# Patient Record
Sex: MALE | Race: WHITE | NOT HISPANIC OR LATINO | Employment: FULL TIME | URBAN - METROPOLITAN AREA
[De-identification: names, ages, dates, MRNs, and addresses within clinical notes are randomized per-mention and may not be internally consistent; named-entity substitution may affect disease eponyms.]

---

## 2017-03-11 ENCOUNTER — APPOINTMENT (OUTPATIENT)
Dept: CARDIOLOGY | Facility: HOSPITAL | Age: 52
End: 2017-03-11

## 2017-03-11 ENCOUNTER — HOSPITAL ENCOUNTER (INPATIENT)
Facility: HOSPITAL | Age: 52
LOS: 3 days | Discharge: HOME | End: 2017-03-14
Attending: HOSPITALIST | Admitting: INTERNAL MEDICINE

## 2017-03-11 DIAGNOSIS — I46.9 ASYSTOLE BY ELECTROCARDIOGRAM (HCC): Primary | ICD-10-CM

## 2017-03-11 PROBLEM — R00.1 BRADYCARDIA BY ELECTROCARDIOGRAM: Status: ACTIVE | Noted: 2017-03-11

## 2017-03-11 PROBLEM — R55 SYNCOPE AND COLLAPSE: Status: ACTIVE | Noted: 2017-03-11

## 2017-03-11 LAB
ALBUMIN SERPL-MCNC: 4.2 G/DL (ref 3.2–4.8)
ALBUMIN/GLOB SERPL: 1.5 G/DL (ref 1.5–2.5)
ALP SERPL-CCNC: 81 U/L (ref 25–100)
ALT SERPL W P-5'-P-CCNC: 23 U/L (ref 7–40)
ANION GAP SERPL CALCULATED.3IONS-SCNC: 7 MMOL/L (ref 3–11)
APTT PPP: 25 SECONDS (ref 24–31)
AST SERPL-CCNC: 24 U/L (ref 0–33)
BASOPHILS # BLD AUTO: 0.01 10*3/MM3 (ref 0–0.2)
BASOPHILS NFR BLD AUTO: 0.1 % (ref 0–1)
BH CV ECHO MEAS - AO MAX PG (FULL): 2 MMHG
BH CV ECHO MEAS - AO MAX PG: 9.7 MMHG
BH CV ECHO MEAS - AO ROOT AREA (BSA CORRECTED): 1.3
BH CV ECHO MEAS - AO ROOT AREA: 5.3 CM^2
BH CV ECHO MEAS - AO ROOT DIAM: 2.6 CM
BH CV ECHO MEAS - AO V2 MAX: 156 CM/SEC
BH CV ECHO MEAS - AVA(V,A): 2.8 CM^2
BH CV ECHO MEAS - AVA(V,D): 2.8 CM^2
BH CV ECHO MEAS - BSA(HAYCOCK): 2.1 M^2
BH CV ECHO MEAS - BSA: 2.1 M^2
BH CV ECHO MEAS - BZI_BMI: 29.5 KILOGRAMS/M^2
BH CV ECHO MEAS - BZI_METRIC_HEIGHT: 175.3 CM
BH CV ECHO MEAS - BZI_METRIC_WEIGHT: 90.7 KG
BH CV ECHO MEAS - CONTRAST EF (2CH): 55.7 ML/M^2
BH CV ECHO MEAS - CONTRAST EF 4CH: 71.7 ML/M^2
BH CV ECHO MEAS - EDV(CUBED): 107.2 ML
BH CV ECHO MEAS - EDV(MOD-SP2): 70 ML
BH CV ECHO MEAS - EDV(MOD-SP4): 106 ML
BH CV ECHO MEAS - EDV(TEICH): 104.9 ML
BH CV ECHO MEAS - EF(CUBED): 81.2 %
BH CV ECHO MEAS - EF(MOD-SP2): 55.7 %
BH CV ECHO MEAS - EF(MOD-SP4): 71.7 %
BH CV ECHO MEAS - EF(TEICH): 73.8 %
BH CV ECHO MEAS - ESV(CUBED): 20.1 ML
BH CV ECHO MEAS - ESV(MOD-SP2): 31 ML
BH CV ECHO MEAS - ESV(MOD-SP4): 30 ML
BH CV ECHO MEAS - ESV(TEICH): 27.5 ML
BH CV ECHO MEAS - FS: 42.7 %
BH CV ECHO MEAS - IVS/LVPW: 0.95
BH CV ECHO MEAS - IVSD: 1.2 CM
BH CV ECHO MEAS - LA DIMENSION: 3.4 CM
BH CV ECHO MEAS - LA/AO: 1.3
BH CV ECHO MEAS - LAT PEAK E' VEL: 7.1 CM/SEC
BH CV ECHO MEAS - LV DIASTOLIC VOL/BSA (35-75): 51.3 ML/M^2
BH CV ECHO MEAS - LV MASS(C)D: 223.3 GRAMS
BH CV ECHO MEAS - LV MASS(C)DI: 108.1 GRAMS/M^2
BH CV ECHO MEAS - LV MAX PG: 7.7 MMHG
BH CV ECHO MEAS - LV MEAN PG: 4 MMHG
BH CV ECHO MEAS - LV SYSTOLIC VOL/BSA (12-30): 14.5 ML/M^2
BH CV ECHO MEAS - LV V1 MAX: 139 CM/SEC
BH CV ECHO MEAS - LV V1 MEAN: 87.7 CM/SEC
BH CV ECHO MEAS - LV V1 VTI: 24 CM
BH CV ECHO MEAS - LVIDD: 4.8 CM
BH CV ECHO MEAS - LVIDS: 2.7 CM
BH CV ECHO MEAS - LVLD AP2: 7.3 CM
BH CV ECHO MEAS - LVLD AP4: 8.1 CM
BH CV ECHO MEAS - LVLS AP2: 6.5 CM
BH CV ECHO MEAS - LVLS AP4: 6.8 CM
BH CV ECHO MEAS - LVOT AREA (M): 3.1 CM^2
BH CV ECHO MEAS - LVOT AREA: 3.1 CM^2
BH CV ECHO MEAS - LVOT DIAM: 2 CM
BH CV ECHO MEAS - LVPWD: 1.3 CM
BH CV ECHO MEAS - MED PEAK E' VEL: 7.6 CM/SEC
BH CV ECHO MEAS - MV A MAX VEL: 79 CM/SEC
BH CV ECHO MEAS - MV E MAX VEL: 94.8 CM/SEC
BH CV ECHO MEAS - MV E/A: 1.2
BH CV ECHO MEAS - PA ACC SLOPE: 710 CM/SEC^2
BH CV ECHO MEAS - PA ACC TIME: 0.13 SEC
BH CV ECHO MEAS - PA MAX PG: 7.3 MMHG
BH CV ECHO MEAS - PA PR(ACCEL): 22.1 MMHG
BH CV ECHO MEAS - PA V2 MAX: 135.5 CM/SEC
BH CV ECHO MEAS - PI END-D VEL: 92.9 CM/SEC
BH CV ECHO MEAS - RVDD: 2.5 CM
BH CV ECHO MEAS - SI(CUBED): 42.1 ML/M^2
BH CV ECHO MEAS - SI(LVOT): 36.5 ML/M^2
BH CV ECHO MEAS - SI(MOD-SP2): 18.9 ML/M^2
BH CV ECHO MEAS - SI(MOD-SP4): 36.8 ML/M^2
BH CV ECHO MEAS - SI(TEICH): 37.5 ML/M^2
BH CV ECHO MEAS - SV(CUBED): 87 ML
BH CV ECHO MEAS - SV(LVOT): 75.4 ML
BH CV ECHO MEAS - SV(MOD-SP2): 39 ML
BH CV ECHO MEAS - SV(MOD-SP4): 76 ML
BH CV ECHO MEAS - SV(TEICH): 77.4 ML
BH CV ECHO MEAS - TAPSE (>1.6): 2.5 CM2
BH CV XLRA - RV BASE: 3.6 CM
BH CV XLRA - RV LENGTH: 7.9 CM
BH CV XLRA - RV MID: 3 CM
BH CV XLRA - TDI S': 18.6 CM/SEC
BILIRUB SERPL-MCNC: 0.5 MG/DL (ref 0.3–1.2)
BUN BLD-MCNC: 17 MG/DL (ref 9–23)
BUN/CREAT SERPL: 18.9 (ref 7–25)
CALCIUM SPEC-SCNC: 9.6 MG/DL (ref 8.7–10.4)
CHLORIDE SERPL-SCNC: 107 MMOL/L (ref 99–109)
CK SERPL-CCNC: 244 U/L (ref 26–174)
CO2 SERPL-SCNC: 26 MMOL/L (ref 20–31)
CREAT BLD-MCNC: 0.9 MG/DL (ref 0.6–1.3)
DEPRECATED RDW RBC AUTO: 41.8 FL (ref 37–54)
E/E' RATIO: 12.9
EOSINOPHIL # BLD AUTO: 0.06 10*3/MM3 (ref 0.1–0.3)
EOSINOPHIL NFR BLD AUTO: 0.5 % (ref 0–3)
ERYTHROCYTE [DISTWIDTH] IN BLOOD BY AUTOMATED COUNT: 12.7 % (ref 11.3–14.5)
GFR SERPL CREATININE-BSD FRML MDRD: 89 ML/MIN/1.73
GLOBULIN UR ELPH-MCNC: 2.8 GM/DL
GLUCOSE BLD-MCNC: 108 MG/DL (ref 70–100)
GLUCOSE BLDC GLUCOMTR-MCNC: 107 MG/DL (ref 70–130)
HCT VFR BLD AUTO: 48.4 % (ref 38.9–50.9)
HGB BLD-MCNC: 16.6 G/DL (ref 13.1–17.5)
IMM GRANULOCYTES # BLD: 0.03 10*3/MM3 (ref 0–0.03)
IMM GRANULOCYTES NFR BLD: 0.3 % (ref 0–0.6)
INR PPP: 0.98
LV EF 2D ECHO EST: 75 %
LYMPHOCYTES # BLD AUTO: 1.63 10*3/MM3 (ref 0.6–4.8)
LYMPHOCYTES NFR BLD AUTO: 14.3 % (ref 24–44)
MAGNESIUM SERPL-MCNC: 2.2 MG/DL (ref 1.3–2.7)
MCH RBC QN AUTO: 31 PG (ref 27–31)
MCHC RBC AUTO-ENTMCNC: 34.3 G/DL (ref 32–36)
MCV RBC AUTO: 90.5 FL (ref 80–99)
MONOCYTES # BLD AUTO: 0.67 10*3/MM3 (ref 0–1)
MONOCYTES NFR BLD AUTO: 5.9 % (ref 0–12)
NEUTROPHILS # BLD AUTO: 9 10*3/MM3 (ref 1.5–8.3)
NEUTROPHILS NFR BLD AUTO: 78.9 % (ref 41–71)
PHOSPHATE SERPL-MCNC: 3 MG/DL (ref 2.4–5.1)
PLATELET # BLD AUTO: 197 10*3/MM3 (ref 150–450)
PMV BLD AUTO: 10.5 FL (ref 6–12)
POTASSIUM BLD-SCNC: 3.8 MMOL/L (ref 3.5–5.5)
PROT SERPL-MCNC: 7 G/DL (ref 5.7–8.2)
PROTHROMBIN TIME: 10.7 SECONDS (ref 9.6–11.5)
RBC # BLD AUTO: 5.35 10*6/MM3 (ref 4.2–5.76)
SODIUM BLD-SCNC: 140 MMOL/L (ref 132–146)
TROPONIN I SERPL-MCNC: <0.006 NG/ML
TSH SERPL DL<=0.05 MIU/L-ACNC: 0.99 MIU/ML (ref 0.35–5.35)
WBC NRBC COR # BLD: 11.4 10*3/MM3 (ref 3.5–10.8)

## 2017-03-11 PROCEDURE — 85610 PROTHROMBIN TIME: CPT | Performed by: NURSE PRACTITIONER

## 2017-03-11 PROCEDURE — 85025 COMPLETE CBC W/AUTO DIFF WBC: CPT | Performed by: NURSE PRACTITIONER

## 2017-03-11 PROCEDURE — 93306 TTE W/DOPPLER COMPLETE: CPT | Performed by: INTERNAL MEDICINE

## 2017-03-11 PROCEDURE — 93010 ELECTROCARDIOGRAM REPORT: CPT | Performed by: INTERNAL MEDICINE

## 2017-03-11 PROCEDURE — 99223 1ST HOSP IP/OBS HIGH 75: CPT | Performed by: INTERNAL MEDICINE

## 2017-03-11 PROCEDURE — 93306 TTE W/DOPPLER COMPLETE: CPT

## 2017-03-11 PROCEDURE — 84100 ASSAY OF PHOSPHORUS: CPT | Performed by: NURSE PRACTITIONER

## 2017-03-11 PROCEDURE — 99254 IP/OBS CNSLTJ NEW/EST MOD 60: CPT | Performed by: INTERNAL MEDICINE

## 2017-03-11 PROCEDURE — 83735 ASSAY OF MAGNESIUM: CPT | Performed by: NURSE PRACTITIONER

## 2017-03-11 PROCEDURE — 82962 GLUCOSE BLOOD TEST: CPT

## 2017-03-11 PROCEDURE — 84443 ASSAY THYROID STIM HORMONE: CPT | Performed by: NURSE PRACTITIONER

## 2017-03-11 PROCEDURE — 82550 ASSAY OF CK (CPK): CPT | Performed by: NURSE PRACTITIONER

## 2017-03-11 PROCEDURE — 85730 THROMBOPLASTIN TIME PARTIAL: CPT | Performed by: NURSE PRACTITIONER

## 2017-03-11 PROCEDURE — 80053 COMPREHEN METABOLIC PANEL: CPT | Performed by: NURSE PRACTITIONER

## 2017-03-11 PROCEDURE — 84484 ASSAY OF TROPONIN QUANT: CPT | Performed by: NURSE PRACTITIONER

## 2017-03-11 PROCEDURE — 93005 ELECTROCARDIOGRAM TRACING: CPT | Performed by: HOSPITALIST

## 2017-03-11 PROCEDURE — 25010000002 ENOXAPARIN PER 10 MG: Performed by: NURSE PRACTITIONER

## 2017-03-11 RX ORDER — FAMOTIDINE 20 MG/1
20 TABLET, FILM COATED ORAL 2 TIMES DAILY PRN
Status: DISCONTINUED | OUTPATIENT
Start: 2017-03-11 | End: 2017-03-14 | Stop reason: HOSPADM

## 2017-03-11 RX ORDER — ACETAMINOPHEN 325 MG/1
650 TABLET ORAL EVERY 4 HOURS PRN
Status: DISCONTINUED | OUTPATIENT
Start: 2017-03-11 | End: 2017-03-14 | Stop reason: HOSPADM

## 2017-03-11 RX ORDER — CALCIUM CARBONATE 200(500)MG
2 TABLET,CHEWABLE ORAL 2 TIMES DAILY PRN
Status: DISCONTINUED | OUTPATIENT
Start: 2017-03-11 | End: 2017-03-14 | Stop reason: HOSPADM

## 2017-03-11 RX ORDER — ONDANSETRON 4 MG/1
4 TABLET, FILM COATED ORAL EVERY 6 HOURS PRN
Status: DISCONTINUED | OUTPATIENT
Start: 2017-03-11 | End: 2017-03-14 | Stop reason: HOSPADM

## 2017-03-11 RX ORDER — SODIUM CHLORIDE 0.9 % (FLUSH) 0.9 %
1-10 SYRINGE (ML) INJECTION AS NEEDED
Status: DISCONTINUED | OUTPATIENT
Start: 2017-03-11 | End: 2017-03-14 | Stop reason: HOSPADM

## 2017-03-11 RX ORDER — ONDANSETRON 2 MG/ML
4 INJECTION INTRAMUSCULAR; INTRAVENOUS EVERY 6 HOURS PRN
Status: DISCONTINUED | OUTPATIENT
Start: 2017-03-11 | End: 2017-03-14 | Stop reason: HOSPADM

## 2017-03-11 RX ADMIN — ENOXAPARIN SODIUM 40 MG: 40 INJECTION SUBCUTANEOUS at 10:26

## 2017-03-11 RX ADMIN — ACETAMINOPHEN 650 MG: 325 TABLET, FILM COATED ORAL at 09:05

## 2017-03-11 NOTE — CONSULTS
[facility] at [facility]   Consult Note    Referring Provider: Dr. Mauricio    Reason for Consultation: Syncope and reported pause    Patient Care Team:  Maribel Lyon MD as PCP - General (Family Medicine)     Problem List:  1. Syncope  1. Previous history of similar episode 15 years ago.  2. Documented significant positive at [facility].  2. Surgeries:  1. Bilateral knee surgery        Allergies   Allergen Reactions   • Levaquin [Levofloxacin] Hives           Current Facility-Administered Medications:   •  acetaminophen (TYLENOL) tablet 650 mg, 650 mg, Oral, Q4H PRN, Jeni Renwick V, APRN, 650 mg at 03/11/17 0905  •  calcium carbonate (TUMS) chewable tablet 500 mg (200 mg elemental), 2 tablet, Oral, BID PRN, Jeni Julián V, APRN  •  enoxaparin (LOVENOX) syringe 40 mg, 40 mg, Subcutaneous, Daily, Jeni Julián V, APRN  •  famotidine (PEPCID) tablet 20 mg, 20 mg, Oral, BID PRN, Jeni Julián V, APRN  •  ondansetron (ZOFRAN) tablet 4 mg, 4 mg, Oral, Q6H PRN **OR** ondansetron (ZOFRAN) injection 4 mg, 4 mg, Intravenous, Q6H PRN, Jeni Julián V, APRN  •  sodium chloride 0.9 % flush 1-10 mL, 1-10 mL, Intravenous, PRN, Jeni Renwick V, APRN         Prescriptions Prior to Admission   Medication Sig Dispense Refill Last Dose   • famciclovir (FAMVIR) 500 MG tablet Take 1 tablet by mouth 3 (three) times a day. 21 tablet 0          Subjective .   History of present illness:    Patient is a 52-year-old  male who were asked to see secondary to syncope as well as documented significant polyp.  He has no previous history of any coronary disease.  He underwent cardiac evaluation with Dr. reid this past summer.  At that time he had a routine stress test which was negative for ischemia.  He also had an echocardiogram which was overall unremarkable.  Patient notes he was in his usual state of health up until about 2 months ago whenever he has noticed some recurrent dizziness.  This has no specific  "triggering factors noted.  Then yesterday he notes that he was sitting on the couch with his wife looking at a computer.  He then had some sharp discomfort in his upper abdomen with subsequent syncope.  Patient's wife notes that he was out for at least a couple of minutes.  EMS was contacted and he was taken to [facility] in [place].  There patient again had another syncopal episode with documented 16 seconds positive.  He was transferred to [facility] for higher level of care and further evaluation.  The patient notes that he has had a similar episode to this roughly 15 years ago.  At that time.  Discussed permanent pacemaker implant.  However, he also coinciding Kun had an infection.  They have wished to have this infection cleared up prior to pacemaker implant the patient notes that he was lost to follow-up.    Second episode of syncope, this associated with 16 seconds of asystole.  Of note the patient has episodes of presyncope that occurs 3-4 times weekly, for which he \"walks around and his heart rate picks up\" with his recurrent recent syncope, he did not get up and time, and asked he had true loss of consciousness.  Social History     Social History   • Marital status:      Spouse name: N/A   • Number of children: N/A   • Years of education: N/A     Occupational History   • Not on file.     Social History Main Topics   • Smoking status: Never Smoker   • Smokeless tobacco: Not on file   • Alcohol use No   • Drug use: No   • Sexual activity: Not on file     Other Topics Concern   • Not on file     Social History Narrative    . Respiratory therapist at [employer]         Family History   Problem Relation Age of Onset   • Stroke Mother 43   • Heart disease Father    • Dysphagia Father          Review of Systems:  Review of Systems   Constitution: Positive for malaise/fatigue. Negative for fever and weakness.   HENT: Positive for headaches. Negative for nosebleeds.    Eyes: " "Negative for redness and visual disturbance.   Cardiovascular: Negative for orthopnea, palpitations and paroxysmal nocturnal dyspnea.   Respiratory: Negative for cough, snoring, sputum production and wheezing.    Hematologic/Lymphatic: Negative for bleeding problem.   Skin: Negative for flushing, itching and rash.   Musculoskeletal: Negative for falls, joint pain and muscle cramps.   Gastrointestinal: Negative for abdominal pain, diarrhea, heartburn, nausea and vomiting.   Genitourinary: Negative for hematuria.   Neurological: Positive for dizziness. Negative for excessive daytime sleepiness and tremors.   Psychiatric/Behavioral: Negative for substance abuse. The patient is not nervous/anxious.             Objective   Vitals:  Blood pressure 150/86, pulse 60, temperature 98.6 °F (37 °C), temperature source Oral, resp. rate 20, height 69\" (175.3 cm), weight 200 lb 3.2 oz (90.8 kg), SpO2 95 %.     Intake/Output Summary (Last 24 hours) at 03/11/17 0912  Last data filed at 03/11/17 0700   Gross per 24 hour   Intake      0 ml   Output    600 ml   Net   -600 ml       Physical Exam   Constitutional: He is oriented to person, place, and time. He appears well-developed and well-nourished. No distress.   HENT:   Head: Normocephalic and atraumatic.   Mouth/Throat: Oropharynx is clear and moist.   Eyes: Right eye exhibits no discharge. Left eye exhibits no discharge.   Neck: No JVD present. Carotid bruit is not present.   Cardiovascular: Normal rate, regular rhythm, S1 normal, S2 normal, normal heart sounds and intact distal pulses.    Pulmonary/Chest: Breath sounds normal.   Abdominal: Soft. Bowel sounds are normal. There is no tenderness.   Musculoskeletal: He exhibits no edema or deformity.   Neurological: He is alert and oriented to person, place, and time.   Skin: Skin is warm and dry.   Psychiatric: His behavior is normal. Thought content normal.       I have examined the patient and agree with the above documented " findings     Results Review:  I reviewed the patient's new clinical results.    Results from last 7 days  Lab Units 03/11/17  0410   WBC 10*3/mm3 11.40*   HEMOGLOBIN g/dL 16.6   HEMATOCRIT % 48.4   PLATELETS 10*3/mm3 197       Results from last 7 days  Lab Units 03/11/17  0410   SODIUM mmol/L 140   POTASSIUM mmol/L 3.8   CHLORIDE mmol/L 107   TOTAL CO2 mmol/L 26.0   BUN mg/dL 17   CREATININE mg/dL 0.90   CALCIUM mg/dL 9.6   BILIRUBIN mg/dL 0.5   ALK PHOS U/L 81   ALT (SGPT) U/L 23   AST (SGOT) U/L 24   GLUCOSE mg/dL 108*       Results from last 7 days  Lab Units 03/11/17  0410   SODIUM mmol/L 140   POTASSIUM mmol/L 3.8   CHLORIDE mmol/L 107   TOTAL CO2 mmol/L 26.0   BUN mg/dL 17   CREATININE mg/dL 0.90   GLUCOSE mg/dL 108*   CALCIUM mg/dL 9.6       Results from last 7 days  Lab Units 03/11/17  0410   INR  0.98       0  Lab Value Date/Time   TROPONINI <0.006 03/11/2017 0410       Results from last 7 days  Lab Units 03/11/17  0410   TSH mIU/mL 0.988                   Tele:  SR    EKG: SR, normal EKG      Assessment/Plan     1. Syncope  2. Sick sinus syndrome with significant positive.      Plan:    1. Patient needs eventual permanent pacemaker implant.  We'll plan to have him evaluated by electrophysiology and possible permanent pacemaker implant on Monday      DANIS Petty obtained past medical, family history, social history, review of systems and functioned as a scribe for the remainder of the dictation for Dr. Lyons.      DANIS Petty  03/11/17  9:12 AM    I, Yair Lyons MD, personally performed the services described in this documentation as scribed by the above individual in my presence, and it is both accurate and complete    Please note that portions of this note may have been completed with a voice recognition program. Efforts were made to edit the dictations, but occasionally words are mistranscribed.

## 2017-03-11 NOTE — H&P
Psychiatric Medicine Services  HISTORY AND PHYSICAL    Primary Care Physician: Maribel Lyon MD    Subjective     Chief Complaint:  Syncope. 16 second systole at OSH.    History of Present Illness:   Very pleasant 51 yo man who was sitting on his couch Friday evening when according to his wife he suddenly complained about abdominal pain and collapsed. He was unresponsive for about 5 minutes. He was transported to local hospital where he was noted to have significant bradycardia. His rate went down to less than 20 and they were preparing to give him atropine. Then he collapsed with a 16 second pause/asystole. He recovered spontaneously. Labs were unremarkable with a negative troponin. He was transferred to [facility] for a higher level of care. The patient denies any chest pain, SOA , nausea or palpitations.  He had one prior syncopal episode approx 8 to 10 years ago and was seen by a cardiologist but didn't pursue any other treatment as he didn't have any further episodes.   In the past year, he was having some exertional dypspnea and saw Dr. Lynn who did a stress test and Echo which were by report, unremarkable.       Review of Systems   Constitutional: Positive for unexpected weight change (25 lbs in past 3 to 4 months. ).   HENT: Negative.    Eyes: Negative.    Respiratory: Positive for shortness of breath.    Cardiovascular: Negative.    Gastrointestinal: Positive for abdominal pain (breif episode simutaneously with syncopal spell at home.).   Endocrine: Negative.    Genitourinary: Negative.    Musculoskeletal: Negative.    Skin: Negative.    Neurological: Negative.    Hematological: Negative.    Psychiatric/Behavioral: Negative.       Otherwise complete ROS performed and negative except as mentioned in the HPI.    Past Medical History   Diagnosis Date   • Asystole by electrocardiogram 08/19/1999     PAC       Past Surgical History   Procedure Laterality Date   • Knee arthroplasty  "unicompartmental bilateral         Family History   Problem Relation Age of Onset   • Stroke Mother 43   • Heart disease Father    • Dysphagia Father        Social History     Social History   • Marital status:      Spouse name: N/A   • Number of children: N/A   • Years of education: N/A     Occupational History   • Not on file.     Social History Main Topics   • Smoking status: Never Smoker   • Smokeless tobacco: Not on file   • Alcohol use No   • Drug use: No   • Sexual activity: Not on file     Other Topics Concern   • Not on file     Social History Narrative    . Respiratory therapist at [employer]           Medications:  Prescriptions Prior to Admission   Medication Sig Dispense Refill Last Dose   • famciclovir (FAMVIR) 500 MG tablet Take 1 tablet by mouth 3 (three) times a day. 21 tablet 0        Allergies:  Allergies   Allergen Reactions   • Levaquin [Levofloxacin] Hives         Objective     Physical Exam:  Vital Signs:   Visit Vitals   • /99 (BP Location: Right arm)   • Pulse 102   • Temp 98.2 °F (36.8 °C) (Oral)   • Resp 20   • Ht 69\" (175.3 cm)   • Wt 200 lb 3.2 oz (90.8 kg)   • SpO2 95%   • BMI 29.56 kg/m2     Physical Exam   Constitutional: He appears well-developed and well-nourished. No distress.   HENT:   Mouth/Throat: Oropharynx is clear and moist.   Eyes: EOM are normal. Pupils are equal, round, and reactive to light. Right eye exhibits no discharge. Left eye exhibits no discharge. No scleral icterus.   Neck: Normal range of motion. Neck supple. No JVD present. No tracheal deviation present. No thyromegaly present.   Cardiovascular: Normal rate, regular rhythm, normal heart sounds and intact distal pulses.  Exam reveals no gallop and no friction rub.    No murmur heard.  Pulmonary/Chest: Effort normal and breath sounds normal. No respiratory distress. He has no wheezes.   Abdominal: Soft. He exhibits no distension and no mass. There is no tenderness. There is no guarding. "   Musculoskeletal: Normal range of motion.   Lymphadenopathy:     He has no cervical adenopathy.   Neurological: He is alert.   Skin: Skin is warm and dry. No rash noted. He is not diaphoretic. No erythema.   Psychiatric: He has a normal mood and affect. His behavior is normal. Judgment and thought content normal.   Nursing note and vitals reviewed.          Results Reviewed:    Results from last 7 days  Lab Units 03/11/17  0410   WBC 10*3/mm3 11.40*   HEMOGLOBIN g/dL 16.6   PLATELETS 10*3/mm3 197           I have personally reviewed and interpreted available lab data, radiology studies and ECG obtained at time of admission.     Assessment / Plan     Assessment/Problem List:   Principal Problem:    Asystole by electrocardiogram  Active Problems:    Bradycardia by electrocardiogram    Syncope and collapse      Plan:  16 asystole at OSH with collapse. Spontaneous recovery. Tele, serial troponin, Cards to see this am. Trend troponins. Labs pending.   2. Bradycardia. Rate in 60's now. Continue tele. Will use atropine if it decreases, EKG shows sinus rhythm.  3. Syncope and collapse: Occurred first at home, then again at OSH. Cards to see this am.      DVT prophylaxis:lovenox/lilly/scd  Code Status:full  Admission Status: Patient will be admitted to  [facility])     Jeni Gallego, DANIS 03/11/17 5:10 AM      I performed a history and physical examination of the patient independent of the nurse practitioner.  I reviewed the patient's laboratory and radiological data.  I discussed the plan and care with the nurse practitioner in details and reviewed her documentation    This is a pleasant 52-year-old  male with a past medical history significant for remote syncopal episodes approximately 15 years ago thought to be due to sick sinus syndrome with sinus pauses that were symptomatic.  The patient was supposedly scheduled for pacemaker placement however was found to have an elevated white blood cell count and this was  delayed and he subsequently did not  follow-up    The patient has been doing well until the past few months where he's experienced episodes of weakness and dizziness and lightheadedness.  More recently had syncopal episode with documented asystole while in the emergency room at an outside hospital    The patient does not have a history of hypertension, hyperlipidemia, diabetes, smoking or known coronary artery disease.  A stress test done a year ago was negative.  A transthoracic echocardiogram per reports is normal    Physical examination:  Vital signs reviewed and within normal range  Gen. appearance: Pleasant  female in no distress. He is awake and alert. She is oriented to person place and time  HEENT: Pupils reactive and responsive to light. Extraocular muscles are intact. Dry mucous membrane: Diminished skin turgor. No oral lesions thrush.  Chest: Clear to auscultation bilaterally.  No wheezing, rales or rhonchi  Cardiovascular: Regular rate and rhythm. No murmurs rubs or gallop no increased jugular venous pulsation  Abdomen: Soft. Non tender to palpation. No palpable masses. No CVA tenderness. Normal bowel sounds.   Extremities: No skin lesions or rashes. No edema. Intact peripheral pulses  Neurologic examination: Motor tone and strength are normal. Intact deep tendon reflexes. No focal neurological deficit.  Psychiatric: appropriate affect    Assessment and plan:  Sinus bradycardia and sinus pauses most likely consistent with symptomatic sick sinus syndrome  No evidence of heart block  No history of autoimmune diseases or infectious etiologies  Further evaluation and management per cardiology

## 2017-03-11 NOTE — PROGRESS NOTES
"Adult Nutrition  Assessment/PES    Patient Name:  Tom Harvey  YOB: 1965  MRN: [id number]  Admit Date:  3/11/2017    Assessment Date:  3/11/2017        Reason for Assessment       03/11/17 1614    Reason for Assessment    Reason For Assessment/Visit --   clarification of nsg documentation indicating \"unsure\" as r/t wt changes prior to adm. Pt states he has had 20-25lb intentional wt loss during past 4 months, as he has been eating less/\"cut back\" on food portion sizes.    Time Spent (min) 20   pt does not meet nutrition risk criteria at this time, based on wt gain and appetite report.                              Problem/Interventions:                        Education/Evaluation       03/11/17 1617    Monitor/Evaluation    Monitor Per protocol        Comments:      Electronically signed by:  Suzi Ortiz MS,RD,LD  03/11/17 4:17 PM  "

## 2017-03-11 NOTE — PLAN OF CARE
Problem: Patient Care Overview (Adult)  Goal: Plan of Care Review  Outcome: Ongoing (interventions implemented as appropriate)    03/11/17 7459   Coping/Psychosocial Response Interventions   Plan Of Care Reviewed With patient;spouse   Patient Care Overview   Progress no change   Outcome Evaluation   Outcome Summary/Follow up Plan Pt SR on tele, vitals stable. Pt medicated once with Tylenol for HA. IV dc'ed in left AC, replaced in left FA. SCUDS placed. Plan for PPM on Monday per cardiology.       Goal: Adult Individualization and Mutuality  Outcome: Ongoing (interventions implemented as appropriate)  Goal: Discharge Needs Assessment  Outcome: Ongoing (interventions implemented as appropriate)    Problem: Fall Risk (Adult)  Goal: Identify Related Risk Factors and Signs and Symptoms  Outcome: Ongoing (interventions implemented as appropriate)  Goal: Absence of Falls  Outcome: Ongoing (interventions implemented as appropriate)    Problem: Arrhythmia/Dysrhythmia (Symptomatic) (Adult)  Goal: Signs and Symptoms of Listed Potential Problems Will be Absent or Manageable (Arrhythmia/Dysrhythmia)  Outcome: Ongoing (interventions implemented as appropriate)

## 2017-03-11 NOTE — PLAN OF CARE
Problem: Patient Care Overview (Adult)  Goal: Plan of Care Review  Outcome: Ongoing (interventions implemented as appropriate)    03/11/17 0603   Coping/Psychosocial Response Interventions   Plan Of Care Reviewed With patient;spouse   Patient Care Overview   Progress progress toward functional goals as expected   Outcome Evaluation   Outcome Summary/Follow up Plan Pt [facility] ambulance. VSS. NSR/SB. No c/o pain. Pacer pads in place.       Goal: Adult Individualization and Mutuality  Outcome: Ongoing (interventions implemented as appropriate)  Goal: Discharge Needs Assessment  Outcome: Ongoing (interventions implemented as appropriate)    Problem: Fall Risk (Adult)  Goal: Identify Related Risk Factors and Signs and Symptoms  Outcome: Ongoing (interventions implemented as appropriate)  Goal: Absence of Falls  Outcome: Ongoing (interventions implemented as appropriate)

## 2017-03-12 PROCEDURE — 99232 SBSQ HOSP IP/OBS MODERATE 35: CPT | Performed by: INTERNAL MEDICINE

## 2017-03-12 PROCEDURE — 25010000002 ENOXAPARIN PER 10 MG: Performed by: NURSE PRACTITIONER

## 2017-03-12 RX ADMIN — ENOXAPARIN SODIUM 40 MG: 40 INJECTION SUBCUTANEOUS at 09:33

## 2017-03-12 NOTE — PLAN OF CARE
Problem: Patient Care Overview (Adult)  Goal: Plan of Care Review  Outcome: Ongoing (interventions implemented as appropriate)    03/12/17 7068   Coping/Psychosocial Response Interventions   Plan Of Care Reviewed With patient   Patient Care Overview   Progress progress toward functional goals as expected   Outcome Evaluation   Outcome Summary/Follow up Plan VSS. Sinus Dexter. Up ambulating in halls. No complaints. Anticipating PPM placement tomorrow.         Problem: Fall Risk (Adult)  Goal: Absence of Falls  Outcome: Ongoing (interventions implemented as appropriate)    Problem: Arrhythmia/Dysrhythmia (Symptomatic) (Adult)  Goal: Signs and Symptoms of Listed Potential Problems Will be Absent or Manageable (Arrhythmia/Dysrhythmia)  Outcome: Ongoing (interventions implemented as appropriate)

## 2017-03-12 NOTE — PLAN OF CARE
Problem: Patient Care Overview (Adult)  Goal: Plan of Care Review  Outcome: Ongoing (interventions implemented as appropriate)    03/11/17 1716 03/12/17 0508   Coping/Psychosocial Response Interventions   Plan Of Care Reviewed With patient;spouse --    Patient Care Overview   Progress --  progress toward functional goals as expected   Outcome Evaluation   Outcome Summary/Follow up Plan --  Pt rested well this shift. VSS. NSR. No c/o pain.       Goal: Adult Individualization and Mutuality  Outcome: Ongoing (interventions implemented as appropriate)  Goal: Discharge Needs Assessment  Outcome: Ongoing (interventions implemented as appropriate)    Problem: Fall Risk (Adult)  Goal: Identify Related Risk Factors and Signs and Symptoms  Outcome: Ongoing (interventions implemented as appropriate)  Goal: Absence of Falls  Outcome: Ongoing (interventions implemented as appropriate)    Problem: Arrhythmia/Dysrhythmia (Symptomatic) (Adult)  Goal: Signs and Symptoms of Listed Potential Problems Will be Absent or Manageable (Arrhythmia/Dysrhythmia)  Outcome: Ongoing (interventions implemented as appropriate)

## 2017-03-12 NOTE — PROGRESS NOTES
"  [facility] at [facility]   Inpatient Progress Note       LOS: 1 day   Patient Care Team:  Maribel Lyon MD as PCP - General (Family Medicine)    Chief Complaint:  Follow-up for syncope and pause    Subjective     Interval History:     Complaints, no dizziness or symptoms last evening.    Review of Systems:   Pertinent positives noted in history, exam, and assessment. Otherwise reviewed and negative.      Objective     Vitals:  Blood pressure 154/92, pulse 54, temperature 97.7 °F (36.5 °C), temperature source Temporal Artery , resp. rate 12, height 69\" (175.3 cm), weight 200 lb 3.2 oz (90.8 kg), SpO2 95 %.     Intake/Output Summary (Last 24 hours) at 03/12/17 0830  Last data filed at 03/11/17 1330   Gross per 24 hour   Intake    540 ml   Output      0 ml   Net    540 ml     Physical Exam   Constitutional: He is oriented to person, place, and time. He appears well-developed and well-nourished.   HENT:   Mouth/Throat: Oropharynx is clear and moist.   Neck: No JVD present. Carotid bruit is not present. No thyromegaly present.   Cardiovascular: Regular rhythm, S1 normal, S2 normal, normal heart sounds and intact distal pulses.  Exam reveals no gallop, no S3 and no S4.    No murmur heard.  Pulses:       Carotid pulses are 2+ on the right side, and 2+ on the left side.       Radial pulses are 2+ on the right side, and 2+ on the left side.   Pulmonary/Chest: Breath sounds normal.   Abdominal: Soft. Bowel sounds are normal. He exhibits no mass. There is no tenderness.   Musculoskeletal: He exhibits no edema.   Neurological: He is alert and oriented to person, place, and time.   Skin: Skin is warm and dry. No rash noted.          Results Review:     I reviewed the patient's new clinical results.      Results from last 7 days  Lab Units 03/11/17  0410   WBC 10*3/mm3 11.40*   HEMOGLOBIN g/dL 16.6   HEMATOCRIT % 48.4   PLATELETS 10*3/mm3 197       Results from last 7 days  Lab Units 03/11/17  0410   SODIUM mmol/L " 140   POTASSIUM mmol/L 3.8   CHLORIDE mmol/L 107   TOTAL CO2 mmol/L 26.0   BUN mg/dL 17   CREATININE mg/dL 0.90   CALCIUM mg/dL 9.6   BILIRUBIN mg/dL 0.5   ALK PHOS U/L 81   ALT (SGPT) U/L 23   AST (SGOT) U/L 24   GLUCOSE mg/dL 108*       Results from last 7 days  Lab Units 03/11/17  0410   SODIUM mmol/L 140   POTASSIUM mmol/L 3.8   CHLORIDE mmol/L 107   TOTAL CO2 mmol/L 26.0   BUN mg/dL 17   CREATININE mg/dL 0.90   GLUCOSE mg/dL 108*   CALCIUM mg/dL 9.6       Results from last 7 days  Lab Units 03/11/17  0410   INR  0.98       0  Lab Value Date/Time   TROPONINI <0.006 03/11/2017 1903   TROPONINI <0.006 03/11/2017 1134   TROPONINI <0.006 03/11/2017 0410       Results from last 7 days  Lab Units 03/11/17  0410   TSH mIU/mL 0.988                 Tele:  SR    Assessment/Plan     Principal Problem:    Asystole by electrocardiogram  Active Problems:    Bradycardia by electrocardiogram    Syncope and collapse      1. Syncope  2. Sick sinus syndrome with significant pause, 16 seconds and symptomatic.    Plan:  Will prep for expected PPM placement.    DANIS Petty  03/12/17  8:30 AM  I, Yair Lyons MD, personally performed the services described in this documentation as scribed by the above individual in my presence, and it is both accurate and complete    Please note that portions of this note may have been completed with a voice recognition program. Efforts were made to edit the dictations, but occasionally words are mistranscribed.

## 2017-03-12 NOTE — PROGRESS NOTES
CHIEF COMPLAINT: Syncope    SUBJECTIVE EVALUATION:  No major events overnight.  The patient denies dyspnea, orthopnea or paroxysmal nocturnal dyspnea.  No chest pain.  No recurrent syncopal episodes.  No nausea, vomiting, abdominal pain, diarrhea or constipation.    REVIEW OF SYSTEMS  14 systems reviewed and are negative except as noted in the subjective evaluation section    OBJECTIVE EVALUATION  Vital signs reviewed and within normal range  Gen. appearance: Pleasant  male in no distress. He is awake and alert. he is oriented to person place and time  HEENT: Pupils reactive and responsive to light. Extraocular muscles are intact.  No oral lesions thrush.  Chest: Clear to auscultation bilaterally.  No wheezing, rales or rhonchi  Cardiovascular: Regular rate and rhythm. No murmurs rubs or gallop no increased jugular venous pulsation  Abdomen: Soft. Non tender to palpation. No palpable masses. No CVA tenderness. Normal bowel sounds.   Extremities: No skin lesions or rashes. No edema. Intact peripheral pulses  Neurologic examination: Motor tone and strength are normal. Intact deep tendon reflexes. No focal neurological deficit.  Psychiatric: appropriate affect    LABORATORY DATA  1.  Troponin less than 0.006  2.  Creatinine 0.9.  Electrolytes within normal range.  Normal magnesium  3.  Normal liver function tests  4.  TSH normal  5.  Complete blood count within normal range    EKG:  Sinus rhythm.  Normal NC, QRS and QT intervals.  No acute ischemic changes    TTE:  Normal ejection fraction.  Cardiac valves are anatomically function normal    Stress test from June 2016.  Negative for myocardial ischemia.  Normal stress test    ASSESSMENT AND PLAN:  Sinus bradycardia and sinus pauses most likely consistent with symptomatic sick sinus syndrome  No evidence of heart block  No history of autoimmune diseases or infectious etiologies  Blood pressure borderline elevated.  Encourage low-sodium diet and exercise.  If no  improvement with nonpharmacological response we'll start chlorthalidone 25 mg daily

## 2017-03-13 PROCEDURE — C1785 PMKR, DUAL, RATE-RESP: HCPCS | Performed by: INTERNAL MEDICINE

## 2017-03-13 PROCEDURE — 33208 INSRT HEART PM ATRIAL & VENT: CPT | Performed by: INTERNAL MEDICINE

## 2017-03-13 PROCEDURE — C1898 LEAD, PMKR, OTHER THAN TRANS: HCPCS | Performed by: INTERNAL MEDICINE

## 2017-03-13 PROCEDURE — A4565 SLINGS: HCPCS | Performed by: INTERNAL MEDICINE

## 2017-03-13 PROCEDURE — 99153 MOD SED SAME PHYS/QHP EA: CPT

## 2017-03-13 PROCEDURE — 99152 MOD SED SAME PHYS/QHP 5/>YRS: CPT

## 2017-03-13 PROCEDURE — 99231 SBSQ HOSP IP/OBS SF/LOW 25: CPT | Performed by: INTERNAL MEDICINE

## 2017-03-13 PROCEDURE — 02HK3JZ INSERTION OF PACEMAKER LEAD INTO RIGHT VENTRICLE, PERCUTANEOUS APPROACH: ICD-10-PCS | Performed by: INTERNAL MEDICINE

## 2017-03-13 PROCEDURE — 25010000002 HEPARIN (PORCINE) PER 1000 UNITS: Performed by: INTERNAL MEDICINE

## 2017-03-13 PROCEDURE — 0JH606Z INSERTION OF PACEMAKER, DUAL CHAMBER INTO CHEST SUBCUTANEOUS TISSUE AND FASCIA, OPEN APPROACH: ICD-10-PCS | Performed by: INTERNAL MEDICINE

## 2017-03-13 PROCEDURE — 25010000002 MIDAZOLAM PER 1 MG: Performed by: INTERNAL MEDICINE

## 2017-03-13 PROCEDURE — 25010000002 ONDANSETRON PER 1 MG: Performed by: INTERNAL MEDICINE

## 2017-03-13 PROCEDURE — C1892 INTRO/SHEATH,FIXED,PEEL-AWAY: HCPCS | Performed by: INTERNAL MEDICINE

## 2017-03-13 PROCEDURE — 25010000002 FENTANYL CITRATE (PF) 100 MCG/2ML SOLUTION: Performed by: INTERNAL MEDICINE

## 2017-03-13 PROCEDURE — 25010000002 VANCOMYCIN HCL IN NACL 1.25-0.9 GM/250ML-% SOLUTION: Performed by: INTERNAL MEDICINE

## 2017-03-13 PROCEDURE — 02H63JZ INSERTION OF PACEMAKER LEAD INTO RIGHT ATRIUM, PERCUTANEOUS APPROACH: ICD-10-PCS | Performed by: INTERNAL MEDICINE

## 2017-03-13 DEVICE — LD PM SOLIA S 45: Type: IMPLANTABLE DEVICE | Site: HEART | Status: FUNCTIONAL

## 2017-03-13 DEVICE — LD PM SOLIA S 53: Type: IMPLANTABLE DEVICE | Site: HEART | Status: FUNCTIONAL

## 2017-03-13 DEVICE — IMPLANTABLE DEVICE
Type: IMPLANTABLE DEVICE | Site: CHEST WALL | Status: FUNCTIONAL
Brand: ELUNA 8 DR-T

## 2017-03-13 RX ORDER — LIDOCAINE HYDROCHLORIDE 10 MG/ML
INJECTION, SOLUTION INFILTRATION; PERINEURAL AS NEEDED
Status: DISCONTINUED | OUTPATIENT
Start: 2017-03-13 | End: 2017-03-13 | Stop reason: HOSPADM

## 2017-03-13 RX ORDER — BUPIVACAINE HYDROCHLORIDE 5 MG/ML
INJECTION, SOLUTION EPIDURAL; INTRACAUDAL AS NEEDED
Status: DISCONTINUED | OUTPATIENT
Start: 2017-03-13 | End: 2017-03-13 | Stop reason: HOSPADM

## 2017-03-13 RX ORDER — FENTANYL CITRATE 50 UG/ML
INJECTION, SOLUTION INTRAMUSCULAR; INTRAVENOUS AS NEEDED
Status: DISCONTINUED | OUTPATIENT
Start: 2017-03-13 | End: 2017-03-13 | Stop reason: HOSPADM

## 2017-03-13 RX ORDER — CITALOPRAM 20 MG/1
20 TABLET ORAL DAILY
Status: DISCONTINUED | OUTPATIENT
Start: 2017-03-13 | End: 2017-03-14 | Stop reason: HOSPADM

## 2017-03-13 RX ORDER — CHLORHEXIDINE GLUCONATE 4 G/100ML
SOLUTION TOPICAL 2 TIMES DAILY
Status: DISCONTINUED | OUTPATIENT
Start: 2017-03-13 | End: 2017-03-14 | Stop reason: HOSPADM

## 2017-03-13 RX ORDER — SODIUM CHLORIDE 0.9 % (FLUSH) 0.9 %
1-10 SYRINGE (ML) INJECTION AS NEEDED
Status: DISCONTINUED | OUTPATIENT
Start: 2017-03-13 | End: 2017-03-14 | Stop reason: HOSPADM

## 2017-03-13 RX ORDER — VANCOMYCIN HYDROCHLORIDE
15 ONCE
Status: COMPLETED | OUTPATIENT
Start: 2017-03-13 | End: 2017-03-13

## 2017-03-13 RX ORDER — VANCOMYCIN HYDROCHLORIDE
15 ONCE
Status: COMPLETED | OUTPATIENT
Start: 2017-03-14 | End: 2017-03-14

## 2017-03-13 RX ORDER — ZOLPIDEM TARTRATE 5 MG/1
5 TABLET ORAL NIGHTLY PRN
Status: DISCONTINUED | OUTPATIENT
Start: 2017-03-13 | End: 2017-03-14 | Stop reason: HOSPADM

## 2017-03-13 RX ORDER — ONDANSETRON 2 MG/ML
INJECTION INTRAMUSCULAR; INTRAVENOUS AS NEEDED
Status: DISCONTINUED | OUTPATIENT
Start: 2017-03-13 | End: 2017-03-13 | Stop reason: HOSPADM

## 2017-03-13 RX ORDER — ONDANSETRON 2 MG/ML
4 INJECTION INTRAMUSCULAR; INTRAVENOUS EVERY 6 HOURS PRN
Status: DISCONTINUED | OUTPATIENT
Start: 2017-03-13 | End: 2017-03-14 | Stop reason: HOSPADM

## 2017-03-13 RX ORDER — MIDAZOLAM HYDROCHLORIDE 1 MG/ML
INJECTION INTRAMUSCULAR; INTRAVENOUS AS NEEDED
Status: DISCONTINUED | OUTPATIENT
Start: 2017-03-13 | End: 2017-03-13 | Stop reason: HOSPADM

## 2017-03-13 RX ORDER — HYDROCODONE BITARTRATE AND ACETAMINOPHEN 10; 325 MG/1; MG/1
1 TABLET ORAL EVERY 4 HOURS PRN
Status: DISCONTINUED | OUTPATIENT
Start: 2017-03-13 | End: 2017-03-14 | Stop reason: HOSPADM

## 2017-03-13 RX ORDER — CEFAZOLIN SODIUM 2 G/100ML
2 INJECTION, SOLUTION INTRAVENOUS ONCE
Status: DISCONTINUED | OUTPATIENT
Start: 2017-03-13 | End: 2017-03-13

## 2017-03-13 RX ORDER — SODIUM CHLORIDE 9 MG/ML
INJECTION, SOLUTION INTRAVENOUS CONTINUOUS PRN
Status: DISCONTINUED | OUTPATIENT
Start: 2017-03-13 | End: 2017-03-13 | Stop reason: HOSPADM

## 2017-03-13 RX ADMIN — VANCOMYCIN HYDROCHLORIDE 1250 MG: 1 INJECTION, POWDER, LYOPHILIZED, FOR SOLUTION INTRAVENOUS at 16:15

## 2017-03-13 RX ADMIN — CHLORHEXIDINE GLUCONATE: 213 SOLUTION TOPICAL at 11:27

## 2017-03-13 NOTE — PROGRESS NOTES
Discharge Planning Assessment  [facility]     Patient Name: Tom Harvey  MRN: [id number]  Today's Date: 3/13/2017    Admit Date: 3/11/2017          Discharge Needs Assessment       03/13/17 1400    Living Environment    Lives With spouse    Living Arrangements house   Lives in 1 level home in [place], [place].    Quality Of Family Relationships supportive    Able to Return to Prior Living Arrangements yes    Discharge Needs Assessment    Concerns To Be Addressed no discharge needs identified    Readmission Within The Last 30 Days no previous admission in last 30 days    Equipment Currently Used at Home none   Has a cane at home, but does not use it.    Equipment Needed After Discharge none    Transportation Available car;family or friend will provide            Discharge Plan       03/13/17 1402    Case Management/Social Work Plan    Plan Home    Patient/Family In Agreement With Plan yes    Additional Comments Talked to Mr. Harvey @ [facility].  He lives in [place], [place] with his wife.  She can assist him at discharge.  He is independent c ADL's.  He works full time.  He has never used HH or Home O2.  His goal is to return home c assist from his wife.  His IVAN Chowdhury plan is current ant up to date.  CM will follow.          Discharge Placement     No information found        Expected Discharge Date and Time     Expected Discharge Date Expected Discharge Time    Mar 14, 2017               Demographic Summary       03/13/17 1358    Referral Information    Admission Type inpatient    Arrived From admitted as an inpatient    Referral Source admission list    Reason For Consult discharge planning    Contact Information    Permission Granted to Share Information With             Functional Status       03/13/17 1400    Functional Status Current    Ambulation 0-->independent    Transferring 0-->independent    Toileting 0-->independent    Bathing 0-->independent    Dressing 0-->independent    Eating 0-->independent     Communication 0-->understands/communicates without difficulty    Swallowing (if score 2 or more for any item, consult Rehab Services) 0-->swallows foods/liquids without difficulty    Functional Status Prior    Ambulation 0-->independent    Transferring 0-->independent    Toileting 0-->independent    Bathing 0-->independent    Dressing 0-->independent    Eating 0-->independent    Communication 0-->understands/communicates without difficulty    Swallowing 0-->swallows foods/liquids without difficulty    IADL    Medications independent    Meal Preparation independent    Housekeeping independent    Laundry independent    Shopping independent    Oral Care independent            Psychosocial     None            Abuse/Neglect     None            Legal     None            Substance Abuse     None            Patient Forms     None          Willi Delacruz, HA

## 2017-03-13 NOTE — PROGRESS NOTES
CHIEF COMPLAINT: Syncope    SUBJECTIVE EVALUATION:  No major events overnight.  The patient denies dyspnea, orthopnea or paroxysmal nocturnal dyspnea.  No chest pain.  No recurrent syncopal episodes.  No nausea, vomiting, abdominal pain, diarrhea or constipation.  Occasional episodes of transient weakness with sinus bradycardia noted on monitor    REVIEW OF SYSTEMS  14 systems reviewed and are negative except as noted in the subjective evaluation section    OBJECTIVE EVALUATION  Vital signs reviewed and within normal range  Gen. appearance: Pleasant  male in no distress. He is awake and alert. he is oriented to person place and time  HEENT: Pupils reactive and responsive to light. Extraocular muscles are intact.  No oral lesions thrush.  Chest: Clear to auscultation bilaterally.  No wheezing, rales or rhonchi  Cardiovascular: Regular rate and rhythm. No murmurs rubs or gallop no increased jugular venous pulsation  Abdomen: Soft. Non tender to palpation. No palpable masses. No CVA tenderness. Normal bowel sounds.   Extremities: No skin lesions or rashes. No edema. Intact peripheral pulses  Neurologic examination: Motor tone and strength are normal. Intact deep tendon reflexes. No focal neurological deficit.  Psychiatric: appropriate affect    LABORATORY DATA  1.  Troponin less than 0.006  2.  Creatinine 0.9.  Electrolytes within normal range.  Normal magnesium  3.  Normal liver function tests  4.  TSH normal  5.  Complete blood count within normal range    EKG:  Sinus rhythm.  Normal ME, QRS and QT intervals.  No acute ischemic changes    TTE:  Normal ejection fraction.  Cardiac valves are anatomically function normal    Stress test from June 2016.  Negative for myocardial ischemia.  Normal stress test    ASSESSMENT AND PLAN:  Sinus bradycardia and sinus pauses most likely consistent with symptomatic sick sinus syndrome  No evidence of heart block  No history of autoimmune diseases or infectious  etiologies  Awaiting EP evaluation and possible Pacemaker Implantation today

## 2017-03-13 NOTE — PLAN OF CARE
Problem: Patient Care Overview (Adult)  Goal: Plan of Care Review  Outcome: Ongoing (interventions implemented as appropriate)    03/13/17 4651   Coping/Psychosocial Response Interventions   Plan Of Care Reviewed With patient   Patient Care Overview   Progress progress toward functional goals as expected   Outcome Evaluation   Outcome Summary/Follow up Plan VSS. Sinus Dexter at times. Mostly NSR. Alert, oriented, in good spirits. Having PPM insertion later this afternoon. Remains NPO.         Problem: Arrhythmia/Dysrhythmia (Symptomatic) (Adult)  Goal: Signs and Symptoms of Listed Potential Problems Will be Absent or Manageable (Arrhythmia/Dysrhythmia)  Outcome: Ongoing (interventions implemented as appropriate)

## 2017-03-14 ENCOUNTER — APPOINTMENT (OUTPATIENT)
Dept: GENERAL RADIOLOGY | Facility: HOSPITAL | Age: 52
End: 2017-03-14

## 2017-03-14 VITALS
DIASTOLIC BLOOD PRESSURE: 93 MMHG | RESPIRATION RATE: 18 BRPM | HEIGHT: 69 IN | HEART RATE: 72 BPM | OXYGEN SATURATION: 96 % | BODY MASS INDEX: 29.65 KG/M2 | WEIGHT: 200.2 LBS | SYSTOLIC BLOOD PRESSURE: 142 MMHG | TEMPERATURE: 97.5 F

## 2017-03-14 PROBLEM — Z95.0 S/P PLACEMENT OF CARDIAC PACEMAKER: Status: ACTIVE | Noted: 2017-03-14

## 2017-03-14 PROCEDURE — 93010 ELECTROCARDIOGRAM REPORT: CPT | Performed by: INTERNAL MEDICINE

## 2017-03-14 PROCEDURE — 99238 HOSP IP/OBS DSCHRG MGMT 30/<: CPT | Performed by: INTERNAL MEDICINE

## 2017-03-14 PROCEDURE — 71020 HC CHEST PA AND LATERAL: CPT

## 2017-03-14 PROCEDURE — 93005 ELECTROCARDIOGRAM TRACING: CPT | Performed by: INTERNAL MEDICINE

## 2017-03-14 PROCEDURE — 25010000002 VANCOMYCIN HCL IN NACL 1.25-0.9 GM/250ML-% SOLUTION: Performed by: INTERNAL MEDICINE

## 2017-03-14 PROCEDURE — 99024 POSTOP FOLLOW-UP VISIT: CPT | Performed by: INTERNAL MEDICINE

## 2017-03-14 RX ORDER — CITALOPRAM 20 MG/1
20 TABLET ORAL DAILY
Qty: 90 TABLET | Refills: 3 | Status: SHIPPED | OUTPATIENT
Start: 2017-03-14 | End: 2018-05-15

## 2017-03-14 RX ADMIN — HYDROCODONE BITARTRATE AND ACETAMINOPHEN 1 TABLET: 10; 325 TABLET ORAL at 09:11

## 2017-03-14 RX ADMIN — CITALOPRAM 20 MG: 20 TABLET, FILM COATED ORAL at 08:51

## 2017-03-14 RX ADMIN — VANCOMYCIN HYDROCHLORIDE 1250 MG: 1 INJECTION, POWDER, LYOPHILIZED, FOR SOLUTION INTRAVENOUS at 04:07

## 2017-03-14 NOTE — DISCHARGE SUMMARY
[facility] Medicine Services  DISCHARGE SUMMARY       Date of Admission: 3/11/2017  Date of Discharge:  3/14/2017  Primary Care Physician: Maribel Lyon MD    Discharge Diagnoses:  Active Hospital Problems (** Indicates Principal Problem)    Diagnosis Date Noted   • **Asystole by electrocardiogram [I46.9] 03/11/2017   • S/P placement of cardiac pacemaker [Z95.0] 03/14/2017   • Bradycardia by electrocardiogram [R00.1] 03/11/2017   • Syncope and collapse [R55] 03/11/2017      Resolved Hospital Problems    Diagnosis Date Noted Date Resolved   No resolved problems to display.     This is a pleasant 52 year old  male who is otherwise healthy, He presented with syncope. Had an asystole event with 16 second pause at outside hospital with spontaneous recovery. Similar episode about 15 years ago and pacemaker was recommended however patient was lost to follow up  Patient was evaluate by EP and a pacemaker was placed without immediate complications such as PTX or hematoma  The patient is discharged home on celexa per EP recommendations    Procedures Performed  Procedure(s):  Pacemaker DC new       Consults:   Consults     Date and Time Order Name Status Description    3/11/2017 0508 Inpatient Consult to Cardiology Completed           Pertinent Test Results:  LABORATORY DATA  1. Troponin less than 0.006  2. Creatinine 0.9. Electrolytes within normal range. Normal magnesium  3. Normal liver function tests  4. TSH normal  5. Complete blood count within normal range     EKG:  Sinus rhythm. Normal ME, QRS and QT intervals. No acute ischemic changes     TTE:  Normal ejection fraction. Cardiac valves are anatomically function normal     Stress test from June 2016. Negative for myocardial ischemia. Normal stress test  Condition on Discharge:  Good    Physical Exam on Discharge:  Visit Vitals   • /93 (BP Location: Right arm, Patient Position: Lying)   • Pulse 72   • Temp 97.5 °F (36.4 °C)  "(Oral)   • Resp 18   • Ht 69\" (175.3 cm)   • Wt 200 lb 3.2 oz (90.8 kg)   • SpO2 96%   • BMI 29.56 kg/m2     Physical Exam  Gen. appearance: Pleasant  male in no distress. He is awake and alert. he is oriented to person place and time  HEENT: Pupils reactive and responsive to light. Extraocular muscles are intact. No oral lesions thrush.  Chest: Clear to auscultation bilaterally. No wheezing, rales or rhonchi  Cardiovascular: Regular rate and rhythm. No murmurs rubs or gallop no increased jugular venous pulsation  Abdomen: Soft. Non tender to palpation. No palpable masses. No CVA tenderness. Normal bowel sounds.   Extremities: No skin lesions or rashes. No edema. Intact peripheral pulses  Neurologic examination: Motor tone and strength are normal. Intact deep tendon reflexes. No focal neurological deficit.  Psychiatric: appropriate affect    Discharge Disposition  Home or Self Care    Discharge Medications   [employer]   Home Medication Instructions OWEN:[id number]    Printed on:03/14/17 2507   Medication Information                      citalopram (CeleXA) 20 MG tablet  Take 1 tablet by mouth Daily.                 Discharge Diet: Low sodium         Activity at Discharge: Per EP recommendations    Follow-up Appointments  Future Appointments  Date Time Provider Department Center   3/23/2017 10:30 AM WOUND CHECK [facility] None   4/25/2017 9:15 AM Be Barron MD [facility] None     Additional Instructions for the Follow-ups that You Need to Schedule     Discharge Follow-up with Specialty    As directed    Specialty:  Dr. PRESTON Rojas. Per their recommendations                 Test Results Pending at Discharge  None     Yovani Mauricio MD 03/14/17 1:42 PM    Time: 25 minutes of time were spent in the preparation of the discharge summary including patient education and coordination of care.    Please note that portions of this note may have been completed with a voice recognition program. " Efforts were made to edit the dictations, but occasionally words are mistranscribed.

## 2017-03-14 NOTE — PLAN OF CARE
Problem: Patient Care Overview (Adult)  Goal: Plan of Care Review  Outcome: Ongoing (interventions implemented as appropriate)    03/14/17 0750   Coping/Psychosocial Response Interventions   Plan Of Care Reviewed With patient   Patient Care Overview   Progress progress toward functional goals as expected   Outcome Evaluation   Outcome Summary/Follow up Plan PPM site intact. VSS. NSR with Paced rhythm at times. Anticipating discharge today.         Problem: Arrhythmia/Dysrhythmia (Symptomatic) (Adult)  Goal: Signs and Symptoms of Listed Potential Problems Will be Absent or Manageable (Arrhythmia/Dysrhythmia)  Outcome: Ongoing (interventions implemented as appropriate)

## 2017-03-14 NOTE — PLAN OF CARE
Problem: Patient Care Overview (Adult)  Goal: Plan of Care Review  Outcome: Ongoing (interventions implemented as appropriate)    03/14/17 6670   Coping/Psychosocial Response Interventions   Plan Of Care Reviewed With patient;spouse   Patient Care Overview   Progress progress toward functional goals as expected   Outcome Evaluation   Outcome Summary/Follow up Plan site clean, dry, no swelling. paced/sinus. room air. no complaints of pain. up to bathroom without complications. VSS.

## 2017-03-14 NOTE — PROGRESS NOTES
"  7:29 AM                               Electrophysiology Follow-up         CC: Syncope, asystole, s/p pacemaker yesterday, no new complaints    Visit Vitals   • /93 (BP Location: Right arm, Patient Position: Lying)   • Pulse 72   • Temp 97.5 °F (36.4 °C) (Oral)   • Resp 18   • Ht 69\" (175.3 cm)   • Wt 200 lb 3.2 oz (90.8 kg)   • SpO2 96%   • BMI 29.56 kg/m2   .      Intake/Output Summary (Last 24 hours) at 03/14/17 0736  Last data filed at 03/14/17 0404   Gross per 24 hour   Intake    250 ml   Output      0 ml   Net    250 ml          Lungs: CTA no wheezing, equal air entry bilaterally    Cor: RRR, physiologic S12, no rubs, gallops or murmurs    Chest: - hematoma, dressing in place    Data: I have personally reviewed CXR, - PTX, good lead positions        Impression         Hospital Problem List     * (Principal)Asystole by electrocardiogram    Overview Signed 3/11/2017  4:55 AM by Jeni Gallego V, APRN     16 second pause at outside hospital with spontaneous recovery         Bradycardia by electrocardiogram    Syncope and collapse    S/P placement of cardiac pacemaker             Suggest     Would suggest d/c home on Celexa, follow-up with me in 6 weeks. He understands his responsibility not to drive x 3 months.  "

## 2017-03-23 ENCOUNTER — OFFICE VISIT (OUTPATIENT)
Dept: CARDIOLOGY | Facility: CLINIC | Age: 52
End: 2017-03-23

## 2017-03-23 DIAGNOSIS — R55 SYNCOPE, UNSPECIFIED SYNCOPE TYPE: Primary | ICD-10-CM

## 2017-03-23 PROCEDURE — 99024 POSTOP FOLLOW-UP VISIT: CPT | Performed by: INTERNAL MEDICINE

## 2017-04-25 ENCOUNTER — OFFICE VISIT (OUTPATIENT)
Dept: CARDIOLOGY | Facility: CLINIC | Age: 52
End: 2017-04-25

## 2017-04-25 VITALS
SYSTOLIC BLOOD PRESSURE: 140 MMHG | BODY MASS INDEX: 30.87 KG/M2 | HEIGHT: 69 IN | DIASTOLIC BLOOD PRESSURE: 90 MMHG | HEART RATE: 80 BPM | WEIGHT: 208.4 LBS

## 2017-04-25 DIAGNOSIS — I46.9 ASYSTOLE BY ELECTROCARDIOGRAM (HCC): ICD-10-CM

## 2017-04-25 DIAGNOSIS — R55 SYNCOPE AND COLLAPSE: Primary | ICD-10-CM

## 2017-04-25 DIAGNOSIS — Z95.0 S/P PLACEMENT OF CARDIAC PACEMAKER: ICD-10-CM

## 2017-04-25 PROCEDURE — 99024 POSTOP FOLLOW-UP VISIT: CPT | Performed by: INTERNAL MEDICINE

## 2017-04-25 PROCEDURE — 93280 PM DEVICE PROGR EVAL DUAL: CPT | Performed by: INTERNAL MEDICINE

## 2017-04-25 RX ORDER — LISINOPRIL 10 MG/1
20 TABLET ORAL DAILY
COMMUNITY
End: 2018-05-15

## 2017-04-25 NOTE — PROGRESS NOTES
"  Chief Complaint: Syncope    History of Present Illness:    The stable established chief complaint has not recurred since the pacemaker implant.    Patient Active Problem List   Diagnosis   • Precordial pain   • Palpitations   • Bradycardia by electrocardiogram   • Asystole by electrocardiogram   • Syncope and collapse   • S/P placement of cardiac pacemaker          Current Outpatient Prescriptions:   •  citalopram (CeleXA) 20 MG tablet, Take 1 tablet by mouth Daily., Disp: 90 tablet, Rfl: 3  •  lisinopril (PRINIVIL,ZESTRIL) 10 MG tablet, Take 10 mg by mouth Every Night., Disp: , Rfl:   Allergies   Allergen Reactions   • Levaquin [Levofloxacin] Hives        ROS:    Denies chest pain, tightness, palpitations, KRUEGER, PND, or edema      /90 (BP Location: Left arm, Patient Position: Sitting)  Pulse 80  Ht 69\" (175.3 cm)  Wt 208 lb 6.4 oz (94.5 kg)  BMI 30.78 kg/m2.      Physical Exam   Pulmonary/Chest:            Lungs: CTA, no wheezing, equal air entry bilaterally, resonant to percussion  Cor: RRR, physiologic S1, S2, no rubs, gallops, murmurs or thrills  Ext: warm, negative edema     Diagnosis Plan   1. Syncope and collapse improved after PPM Continue CITALOPRAM, ? D/C IN 2 MONTHS TO SEE IF PM IS ENOUGH TO KEEP FROM HAVING ANY ISSUES   2. Asystole by electrocardiogram     3. S/P placement of cardiac pacemaker              Device Check (PM)    Company BTK  Mode DDD-CLS  Lower Rate 70 bpm  Upper rate 130 bpm         Thresholds    % pacing 82  Atrial Pacing 0.8 Volts @ 0.4 ms  Atrial Sensing 6 mV  Atrial Impedence 526 Ohms    % pacing 0  Right Ventricular Pacing 0.6 Volts @ 0.4 ms  Right Ventricular Sensing 14 mV  Right Ventricular Impedence 663 Ohms      Battery Voltage 100 Volts  Longevity 9Y    Episodes SINUS TACHYCARDIA    Reprogramming THRESHOLDS TO CHRONIC    Comments NORMAL FUNCTION  "

## 2017-06-27 ENCOUNTER — CLINICAL SUPPORT NO REQUIREMENTS (OUTPATIENT)
Dept: CARDIOLOGY | Facility: CLINIC | Age: 52
End: 2017-06-27

## 2017-06-27 DIAGNOSIS — R55 SYNCOPE AND COLLAPSE: Primary | ICD-10-CM

## 2017-06-27 NOTE — PROGRESS NOTES
Pt wants to stay on Celexa at this time.  He says he guess it is helping.  Device interrogated- see attached interrogation.

## 2017-10-03 ENCOUNTER — CLINICAL SUPPORT NO REQUIREMENTS (OUTPATIENT)
Dept: CARDIOLOGY | Facility: CLINIC | Age: 52
End: 2017-10-03

## 2017-10-03 DIAGNOSIS — R00.1 SINUS BRADYCARDIA: ICD-10-CM

## 2017-10-03 PROCEDURE — 93294 REM INTERROG EVL PM/LDLS PM: CPT | Performed by: PHYSICIAN ASSISTANT

## 2017-10-03 PROCEDURE — 93296 REM INTERROG EVL PM/IDS: CPT | Performed by: PHYSICIAN ASSISTANT

## 2017-10-27 ENCOUNTER — HOSPITAL ENCOUNTER (OUTPATIENT)
Dept: GENERAL RADIOLOGY | Facility: HOSPITAL | Age: 52
Discharge: HOME | End: 2017-10-27
Admitting: FAMILY MEDICINE

## 2017-10-27 ENCOUNTER — TRANSCRIBE ORDERS (OUTPATIENT)
Dept: ADMINISTRATIVE | Facility: HOSPITAL | Age: 52
End: 2017-10-27

## 2017-10-27 DIAGNOSIS — M25.511 RIGHT SHOULDER PAIN, UNSPECIFIED CHRONICITY: Primary | ICD-10-CM

## 2017-10-27 PROCEDURE — 73030 X-RAY EXAM OF SHOULDER: CPT

## 2017-12-28 ENCOUNTER — OFFICE VISIT (OUTPATIENT)
Dept: CARDIOLOGY | Facility: CLINIC | Age: 52
End: 2017-12-28

## 2017-12-28 VITALS
WEIGHT: 218.2 LBS | BODY MASS INDEX: 32.32 KG/M2 | SYSTOLIC BLOOD PRESSURE: 132 MMHG | HEIGHT: 69 IN | HEART RATE: 80 BPM | DIASTOLIC BLOOD PRESSURE: 60 MMHG

## 2017-12-28 DIAGNOSIS — Z95.0 S/P PLACEMENT OF CARDIAC PACEMAKER: ICD-10-CM

## 2017-12-28 DIAGNOSIS — I10 ESSENTIAL HYPERTENSION: ICD-10-CM

## 2017-12-28 DIAGNOSIS — R00.1 BRADYCARDIA BY ELECTROCARDIOGRAM: Primary | ICD-10-CM

## 2017-12-28 DIAGNOSIS — R00.2 PALPITATIONS: ICD-10-CM

## 2017-12-28 PROCEDURE — 93280 PM DEVICE PROGR EVAL DUAL: CPT | Performed by: CLINICAL NURSE SPECIALIST

## 2017-12-28 PROCEDURE — 99214 OFFICE O/P EST MOD 30 MIN: CPT | Performed by: CLINICAL NURSE SPECIALIST

## 2017-12-28 NOTE — PROGRESS NOTES
"    [facility] Cardiology Services  Electrophysiology Office Visit    Tom Harvey  1965  [phone]    12/28/2017    Location:    Maribel Lyon MD  [address] [place] [id number]    Chief Complaint/ Reason For Visit:   Chief Complaint   Patient presents with   • Palpitations     F/U        Problem List:  #1 sinus arrest status post implantation of Biotronik dual-chamber pacemaker  #2 syncope in setting of #1  #3 hypertension  #4 fatigue, chronic    Allergies  Allergies   Allergen Reactions   • Levaquin [Levofloxacin] Hives       Current Medications    Current Outpatient Prescriptions:   •  citalopram (CeleXA) 20 MG tablet, Take 1 tablet by mouth Daily. (Patient taking differently: Take 10 mg by mouth Daily.), Disp: 90 tablet, Rfl: 3  •  lisinopril (PRINIVIL,ZESTRIL) 10 MG tablet, Take 20 mg by mouth Daily., Disp: , Rfl:     History of Present Illness   HPI    Patient presents for follow up of SSS and syncope s/p BTK DDD PPM.  Since the last visit, the patient denies any palpitations, SOB, chest pain, near syncope. No syncope.  He reports ongoing chronic fatigue which is unchanged with his recent increase in dose of lisinopril for better blood pressure management.  He participates in remote monitoring for his pacemaker and has no issues with his device.  Denies any hospitalizations, ER visits, bleeding, or TIA/CVA symptoms. Overall feels well.    ROS:  General:  Positive for fatigue, no weight gain or loss  Cardiovascular:  Denies CP, PND, syncope, near syncope, edema or palpitations.  Pulmonary:  Denies KRUEGER, cough, or wheezing  All other review of systems have been completed and are negative    Physical Exam:  Vitals:    12/28/17 1422   BP: 132/60   BP Location: Right arm   Patient Position: Sitting   Pulse: 80   Weight: 99 kg (218 lb 3.2 oz)   Height: 175.3 cm (69\")     General: NAD  Neck: no JVD, no carotid bruits, no TM  Heart RRR, NL S1, S2, S4 present, no " rubs, murmurs  Lungs: CTA, no wheezes, rhonchi, or rales  Abd: soft, non-tender, NL BS  Ext: No musculoskeletal deformities, no edema, cyanosis, or clubbing  Psych: normal mood and affect    Diagnostic Data:  Procedures    Device interrogation: [employer]elronik alumina DART dual-chamber pacemaker interrogation demonstrates pacing in the right atrium 78% in the right ventricle 0%.  P waves are 6.3 mV and R waves are 14.3 mV.  Threshold in the right atrium is 0.6 V at 0.4 ms and in the right ventricle is 0.8 V at 0.4 ms.  Impedance in the right atrium is 507 ohms and the right ventricle 643 ohms.  Estimated battery life is 95% with longevity of 8 years and 8 months.  Events demonstrate 0% atrial burden with only one high rate episode demonstrating what appears to be atrial tachycardia lasting seconds.  No changes have been made this interrogation.    1. Bradycardia by electrocardiogram    2. S/P placement of cardiac pacemaker    3. Palpitations    4. Essential hypertension        Assessment:   1. Sinus arrest s/p implantation of BTK DDD PPM  2. Syncope- no recurrence  3. HTN- stable  4.  Fatigue-chronic, unchanged    Plan:   1. Continue current medical regimen with lisinopril 20 mg daily  2.  Continue remote device monitoring  3. Follow up in 6 months with Dr. Sethi and [employer] device interrogation      DANIS Parry, MSN, ANP-C  Adult Nurse Practitioner  Cardiac Electrophysiology  12/28/2017  2:57 PM

## 2018-02-18 ENCOUNTER — HOSPITAL ENCOUNTER (EMERGENCY)
Facility: HOSPITAL | Age: 53
Discharge: HOME | End: 2018-02-18
Attending: EMERGENCY MEDICINE | Admitting: EMERGENCY MEDICINE

## 2018-02-18 ENCOUNTER — APPOINTMENT (OUTPATIENT)
Dept: CT IMAGING | Facility: HOSPITAL | Age: 53
End: 2018-02-18

## 2018-02-18 ENCOUNTER — APPOINTMENT (OUTPATIENT)
Dept: GENERAL RADIOLOGY | Facility: HOSPITAL | Age: 53
End: 2018-02-18

## 2018-02-18 VITALS
BODY MASS INDEX: 30.64 KG/M2 | WEIGHT: 214 LBS | TEMPERATURE: 98.2 F | RESPIRATION RATE: 16 BRPM | SYSTOLIC BLOOD PRESSURE: 120 MMHG | OXYGEN SATURATION: 96 % | HEART RATE: 73 BPM | DIASTOLIC BLOOD PRESSURE: 88 MMHG | HEIGHT: 70 IN

## 2018-02-18 DIAGNOSIS — R00.2 PALPITATIONS: Primary | ICD-10-CM

## 2018-02-18 DIAGNOSIS — R06.00 DYSPNEA, UNSPECIFIED TYPE: ICD-10-CM

## 2018-02-18 LAB
ALBUMIN SERPL-MCNC: 4.5 G/DL (ref 3.2–4.8)
ALBUMIN/GLOB SERPL: 1.7 G/DL (ref 1.5–2.5)
ALP SERPL-CCNC: 76 U/L (ref 25–100)
ALT SERPL W P-5'-P-CCNC: 37 U/L (ref 7–40)
ANION GAP SERPL CALCULATED.3IONS-SCNC: 6 MMOL/L (ref 3–11)
AST SERPL-CCNC: 24 U/L (ref 0–33)
BASOPHILS # BLD AUTO: 0.02 10*3/MM3 (ref 0–0.2)
BASOPHILS NFR BLD AUTO: 0.2 % (ref 0–1)
BILIRUB SERPL-MCNC: 0.5 MG/DL (ref 0.3–1.2)
BNP SERPL-MCNC: <2 PG/ML (ref 0–100)
BUN BLD-MCNC: 18 MG/DL (ref 9–23)
BUN/CREAT SERPL: 15 (ref 7–25)
CALCIUM SPEC-SCNC: 9.6 MG/DL (ref 8.7–10.4)
CHLORIDE SERPL-SCNC: 105 MMOL/L (ref 99–109)
CO2 SERPL-SCNC: 29 MMOL/L (ref 20–31)
CREAT BLD-MCNC: 1.2 MG/DL (ref 0.6–1.3)
D DIMER PPP FEU-MCNC: 0.37 MG/L (FEU) (ref 0–0.5)
DEPRECATED RDW RBC AUTO: 41.5 FL (ref 37–54)
EOSINOPHIL # BLD AUTO: 0.19 10*3/MM3 (ref 0–0.3)
EOSINOPHIL NFR BLD AUTO: 2.1 % (ref 0–3)
ERYTHROCYTE [DISTWIDTH] IN BLOOD BY AUTOMATED COUNT: 12.3 % (ref 11.3–14.5)
GFR SERPL CREATININE-BSD FRML MDRD: 63 ML/MIN/1.73
GLOBULIN UR ELPH-MCNC: 2.6 GM/DL
GLUCOSE BLD-MCNC: 76 MG/DL (ref 70–100)
HCT VFR BLD AUTO: 47.7 % (ref 38.9–50.9)
HGB BLD-MCNC: 16.3 G/DL (ref 13.1–17.5)
HOLD SPECIMEN: NORMAL
HOLD SPECIMEN: NORMAL
IMM GRANULOCYTES # BLD: 0.01 10*3/MM3 (ref 0–0.03)
IMM GRANULOCYTES NFR BLD: 0.1 % (ref 0–0.6)
LIPASE SERPL-CCNC: 46 U/L (ref 6–51)
LYMPHOCYTES # BLD AUTO: 2.68 10*3/MM3 (ref 0.6–4.8)
LYMPHOCYTES NFR BLD AUTO: 29.5 % (ref 24–44)
MCH RBC QN AUTO: 31.1 PG (ref 27–31)
MCHC RBC AUTO-ENTMCNC: 34.2 G/DL (ref 32–36)
MCV RBC AUTO: 91 FL (ref 80–99)
MONOCYTES # BLD AUTO: 0.81 10*3/MM3 (ref 0–1)
MONOCYTES NFR BLD AUTO: 8.9 % (ref 0–12)
NEUTROPHILS # BLD AUTO: 5.38 10*3/MM3 (ref 1.5–8.3)
NEUTROPHILS NFR BLD AUTO: 59.2 % (ref 41–71)
PLATELET # BLD AUTO: 199 10*3/MM3 (ref 150–450)
PMV BLD AUTO: 9.7 FL (ref 6–12)
POTASSIUM BLD-SCNC: 3.8 MMOL/L (ref 3.5–5.5)
PROT SERPL-MCNC: 7.1 G/DL (ref 5.7–8.2)
RBC # BLD AUTO: 5.24 10*6/MM3 (ref 4.2–5.76)
SODIUM BLD-SCNC: 140 MMOL/L (ref 132–146)
TROPONIN I SERPL-MCNC: 0.01 NG/ML (ref 0–0.07)
TROPONIN I SERPL-MCNC: 0.01 NG/ML (ref 0–0.07)
WBC NRBC COR # BLD: 9.09 10*3/MM3 (ref 3.5–10.8)
WHOLE BLOOD HOLD SPECIMEN: NORMAL
WHOLE BLOOD HOLD SPECIMEN: NORMAL

## 2018-02-18 PROCEDURE — 71045 X-RAY EXAM CHEST 1 VIEW: CPT

## 2018-02-18 PROCEDURE — 70450 CT HEAD/BRAIN W/O DYE: CPT

## 2018-02-18 PROCEDURE — 93005 ELECTROCARDIOGRAM TRACING: CPT | Performed by: NURSE PRACTITIONER

## 2018-02-18 PROCEDURE — 83690 ASSAY OF LIPASE: CPT | Performed by: EMERGENCY MEDICINE

## 2018-02-18 PROCEDURE — 83880 ASSAY OF NATRIURETIC PEPTIDE: CPT | Performed by: EMERGENCY MEDICINE

## 2018-02-18 PROCEDURE — 99284 EMERGENCY DEPT VISIT MOD MDM: CPT

## 2018-02-18 PROCEDURE — 84484 ASSAY OF TROPONIN QUANT: CPT

## 2018-02-18 PROCEDURE — 80053 COMPREHEN METABOLIC PANEL: CPT | Performed by: EMERGENCY MEDICINE

## 2018-02-18 PROCEDURE — 85025 COMPLETE CBC W/AUTO DIFF WBC: CPT | Performed by: EMERGENCY MEDICINE

## 2018-02-18 PROCEDURE — 85379 FIBRIN DEGRADATION QUANT: CPT | Performed by: NURSE PRACTITIONER

## 2018-02-18 PROCEDURE — 93005 ELECTROCARDIOGRAM TRACING: CPT

## 2018-02-18 RX ORDER — FLUTICASONE PROPIONATE 50 MCG
2 SPRAY, SUSPENSION (ML) NASAL DAILY
COMMUNITY
End: 2018-06-14 | Stop reason: SDUPTHER

## 2018-02-18 RX ORDER — SODIUM CHLORIDE 0.9 % (FLUSH) 0.9 %
10 SYRINGE (ML) INJECTION AS NEEDED
Status: DISCONTINUED | OUTPATIENT
Start: 2018-02-18 | End: 2018-02-19 | Stop reason: HOSPADM

## 2018-02-18 RX ORDER — ASPIRIN 81 MG/1
324 TABLET, CHEWABLE ORAL ONCE
Status: COMPLETED | OUTPATIENT
Start: 2018-02-18 | End: 2018-02-18

## 2018-02-18 RX ADMIN — ASPIRIN 81 MG 324 MG: 81 TABLET ORAL at 21:11

## 2018-02-19 NOTE — ED PROVIDER NOTES
"Subjective   HPI Comments: Tom Harvey is a 53 y.o.male with history of hypertension who presents to the ED with complaints of palpitations which began two weeks ago. The patient reports developing racing heart palpitations with associated shortness of breath, dizziness, nausea, and a mild dull headache during activity such as standing and walking. He notes his heart rate has elevated to 115-120 bpm during the episodes. If he sits down, his symptoms complete resolve within 2-3 minutes. He also complains of a frequent cough and an occasional \"wheeze sensation\" in his upper substernal chest region but denies urinary symptoms, abdominal pain, BLE swelling, vomiting, or any other complaints at this time. He denies recent surgeries or recent travel. He has a pacemaker which was placed in March 2017 by Dr. Barron, Cardiology. It was last interrogated in December 2017. He was advised that the pacemaker was in good condition at that time. He has had a stress test and echocardiogram performed in the past. He has no history of atrial fibrillation or diabetes mellitus. He is not on any blood thinners.         Patient is a 53 y.o. male presenting with palpitations.   History provided by:  Patient  Palpitations   Palpitations quality:  Fast  Onset quality:  Sudden  Duration:  2 weeks  Timing:  Intermittent  Progression:  Unchanged  Chronicity:  New  Context comment:  Activity   Relieved by: sitting, rest.  Exacerbated by: activity such as walking or standing   Ineffective treatments:  None tried  Associated symptoms: chest pain (A \"wheeze sensation\" in his upper substernal chest ), cough, dizziness, nausea, near-syncope and shortness of breath    Associated symptoms: no lower extremity edema, no syncope and no vomiting    Risk factors: no diabetes mellitus and no hx of atrial fibrillation        Review of Systems   Respiratory: Positive for cough and shortness of breath.    Cardiovascular: Positive for chest pain (A " "\"wheeze sensation\" in his upper substernal chest ), palpitations and near-syncope. Negative for leg swelling and syncope.   Gastrointestinal: Positive for nausea. Negative for abdominal pain and vomiting.   Genitourinary: Negative for difficulty urinating, dysuria, frequency, hematuria and urgency.   Neurological: Positive for dizziness, light-headedness and headaches. Negative for syncope.   All other systems reviewed and are negative.      Past Medical History:   Diagnosis Date   • Asystole by electrocardiogram 08/19/1999    PAC       Allergies   Allergen Reactions   • Levaquin [Levofloxacin] Hives       Past Surgical History:   Procedure Laterality Date   • CARDIAC ELECTROPHYSIOLOGY PROCEDURE N/A 3/13/2017    Procedure: Pacemaker DC new;  Surgeon: Be Barron MD;  Location: [facility] INVASIVE LOCATION;  Service:    • KNEE ARTHROPLASTY UNICOMPARTMENTAL BILATERAL     • PACEMAKER IMPLANTATION         Family History   Problem Relation Age of Onset   • Stroke Mother 43   • Heart disease Father    • Dysphagia Father        Social History     Social History   • Marital status:      Spouse name: N/A   • Number of children: N/A   • Years of education: N/A     Social History Main Topics   • Smoking status: Never Smoker   • Smokeless tobacco: Never Used   • Alcohol use No   • Drug use: No   • Sexual activity: Defer     Other Topics Concern   • None     Social History Narrative    . Respiratory therapist at [employer]             Objective   Physical Exam   Constitutional: He is oriented to person, place, and time. He appears well-developed and well-nourished. No distress.   HENT:   Head: Normocephalic and atraumatic.   Right Ear: External ear normal.   Mouth/Throat: No oropharyngeal exudate.   Eyes: EOM are normal. Pupils are equal, round, and reactive to light.   Neck: Normal range of motion.   Cardiovascular: Normal rate, regular rhythm and normal heart sounds.    Pulmonary/Chest: Effort normal " and breath sounds normal. No respiratory distress.   Abdominal: Soft. Bowel sounds are normal. He exhibits no distension. There is no tenderness.   Musculoskeletal: Normal range of motion. He exhibits no edema or tenderness.   Neurological: He is alert and oriented to person, place, and time.   Skin: Skin is warm and dry.   Psychiatric: He has a normal mood and affect. His behavior is normal.   Nursing note and vitals reviewed.      Procedures         ED Course  ED Course   Comment By Time   2300  Biotronik was at bedside to evaluate the patient pacemaker.  He advises that the pacemaker checked out.  Patient has had 2 negative troponins.  Patient's had a negative d-dimer.  Patient had a negative chest x-ray and head CT.  Patient will be discharged home and referred to outpatient chest pain clinic.  Patient to follow up with cardiology, PCP.  Patient encouraged to return immediately for any return symptoms.  Patient agrees and verbalizes understanding. Gwendolyn Garcia, APRN 02/19 0151     Recent Results (from the past 24 hour(s))   Comprehensive Metabolic Panel    Collection Time: 02/18/18  8:01 PM   Result Value Ref Range    Glucose 76 70 - 100 mg/dL    BUN 18 9 - 23 mg/dL    Creatinine 1.20 0.60 - 1.30 mg/dL    Sodium 140 132 - 146 mmol/L    Potassium 3.8 3.5 - 5.5 mmol/L    Chloride 105 99 - 109 mmol/L    CO2 29.0 20.0 - 31.0 mmol/L    Calcium 9.6 8.7 - 10.4 mg/dL    Total Protein 7.1 5.7 - 8.2 g/dL    Albumin 4.50 3.20 - 4.80 g/dL    ALT (SGPT) 37 7 - 40 U/L    AST (SGOT) 24 0 - 33 U/L    Alkaline Phosphatase 76 25 - 100 U/L    Total Bilirubin 0.5 0.3 - 1.2 mg/dL    eGFR Non African Amer 63 >60 mL/min/1.73    Globulin 2.6 gm/dL    A/G Ratio 1.7 1.5 - 2.5 g/dL    BUN/Creatinine Ratio 15.0 7.0 - 25.0    Anion Gap 6.0 3.0 - 11.0 mmol/L   Lipase    Collection Time: 02/18/18  8:01 PM   Result Value Ref Range    Lipase 46 6 - 51 U/L   BNP    Collection Time: 02/18/18  8:01 PM   Result Value Ref Range    BNP <2.0 0.0  - 100.0 pg/mL   Light Blue Top    Collection Time: 02/18/18  8:01 PM   Result Value Ref Range    Extra Tube hold for add-on    Green Top (Gel)    Collection Time: 02/18/18  8:01 PM   Result Value Ref Range    Extra Tube Hold for add-ons.    Lavender Top    Collection Time: 02/18/18  8:01 PM   Result Value Ref Range    Extra Tube hold for add-on    Gold Top - SST    Collection Time: 02/18/18  8:01 PM   Result Value Ref Range    Extra Tube Hold for add-ons.    CBC Auto Differential    Collection Time: 02/18/18  8:01 PM   Result Value Ref Range    WBC 9.09 3.50 - 10.80 10*3/mm3    RBC 5.24 4.20 - 5.76 10*6/mm3    Hemoglobin 16.3 13.1 - 17.5 g/dL    Hematocrit 47.7 38.9 - 50.9 %    MCV 91.0 80.0 - 99.0 fL    MCH 31.1 (H) 27.0 - 31.0 pg    MCHC 34.2 32.0 - 36.0 g/dL    RDW 12.3 11.3 - 14.5 %    RDW-SD 41.5 37.0 - 54.0 fl    MPV 9.7 6.0 - 12.0 fL    Platelets 199 150 - 450 10*3/mm3    Neutrophil % 59.2 41.0 - 71.0 %    Lymphocyte % 29.5 24.0 - 44.0 %    Monocyte % 8.9 0.0 - 12.0 %    Eosinophil % 2.1 0.0 - 3.0 %    Basophil % 0.2 0.0 - 1.0 %    Immature Grans % 0.1 0.0 - 0.6 %    Neutrophils, Absolute 5.38 1.50 - 8.30 10*3/mm3    Lymphocytes, Absolute 2.68 0.60 - 4.80 10*3/mm3    Monocytes, Absolute 0.81 0.00 - 1.00 10*3/mm3    Eosinophils, Absolute 0.19 0.00 - 0.30 10*3/mm3    Basophils, Absolute 0.02 0.00 - 0.20 10*3/mm3    Immature Grans, Absolute 0.01 0.00 - 0.03 10*3/mm3   D-dimer, Quantitative    Collection Time: 02/18/18  8:01 PM   Result Value Ref Range    D-Dimer, Quantitative 0.37 0.00 - 0.50 mg/L (FEU)   POC Troponin, Rapid    Collection Time: 02/18/18  8:06 PM   Result Value Ref Range    Troponin I 0.01 0.00 - 0.07 ng/mL   POC Troponin, Rapid    Collection Time: 02/18/18 10:12 PM   Result Value Ref Range    Troponin I 0.01 0.00 - 0.07 ng/mL     Note: In addition to lab results from this visit, the labs listed above may include labs taken at another facility or during a different encounter within the last 24  hours. Please correlate lab times with ED admission and discharge times for further clarification of the services performed during this visit.    CT Head Without Contrast   Final Result     Normal head/brain CT.      THIS DOCUMENT HAS BEEN ELECTRONICALLY SIGNED BY ASCENCION MONTILLA MD      XR Chest 1 View   Final Result     No acute findings.      THIS DOCUMENT HAS BEEN ELECTRONICALLY SIGNED BY ASCENCION MONTILLA MD        Vitals:    02/18/18 2300 02/18/18 2306 02/18/18 2330 02/18/18 2346   BP: 137/90  120/88 120/88   BP Location:       Patient Position:       Pulse:  76 74 73   Resp:    16   Temp:       TempSrc:       SpO2:  97% 94% 96%   Weight:       Height:         Medications   aspirin chewable tablet 324 mg (324 mg Oral Given 2/18/18 2111)     ECG/EMG Results (last 24 hours)     Procedure Component Value Units Date/Time    ECG 12 Lead [[id number]] Collected:  02/18/18 1956     Updated:  02/18/18 1957    ECG 12 Lead [[id number]] Collected:  02/18/18 2213     Updated:  02/18/18 2214                          MDM    Final diagnoses:   Palpitations   Dyspnea, unspecified type       Documentation assistance provided by jay jay Vasquez.  Information recorded by the scribe was done at my direction and has been verified and validated by me.     Edda Vasquez  02/18/18 2102       Edda Vasquez  02/18/18 2320       DANIS Hunt  02/19/18 0152

## 2018-04-24 ENCOUNTER — CLINICAL SUPPORT NO REQUIREMENTS (OUTPATIENT)
Dept: CARDIOLOGY | Facility: CLINIC | Age: 53
End: 2018-04-24

## 2018-04-24 DIAGNOSIS — R00.1 BRADYCARDIA BY ELECTROCARDIOGRAM: ICD-10-CM

## 2018-04-24 PROCEDURE — 93296 REM INTERROG EVL PM/IDS: CPT | Performed by: INTERNAL MEDICINE

## 2018-04-24 PROCEDURE — 93294 REM INTERROG EVL PM/LDLS PM: CPT | Performed by: INTERNAL MEDICINE

## 2018-05-15 ENCOUNTER — OFFICE VISIT (OUTPATIENT)
Dept: FAMILY MEDICINE CLINIC | Facility: CLINIC | Age: 53
End: 2018-05-15

## 2018-05-15 ENCOUNTER — TELEPHONE (OUTPATIENT)
Dept: FAMILY MEDICINE CLINIC | Facility: CLINIC | Age: 53
End: 2018-05-15

## 2018-05-15 VITALS
DIASTOLIC BLOOD PRESSURE: 84 MMHG | OXYGEN SATURATION: 98 % | SYSTOLIC BLOOD PRESSURE: 122 MMHG | WEIGHT: 221 LBS | BODY MASS INDEX: 32.73 KG/M2 | HEIGHT: 69 IN | HEART RATE: 72 BPM

## 2018-05-15 DIAGNOSIS — M25.50 POLYARTHRALGIA: ICD-10-CM

## 2018-05-15 DIAGNOSIS — K58.0 IRRITABLE BOWEL SYNDROME WITH DIARRHEA: ICD-10-CM

## 2018-05-15 DIAGNOSIS — Z13.1 SCREENING FOR DIABETES MELLITUS: ICD-10-CM

## 2018-05-15 DIAGNOSIS — Z13.6 ENCOUNTER FOR LIPID SCREENING FOR CARDIOVASCULAR DISEASE: ICD-10-CM

## 2018-05-15 DIAGNOSIS — I10 ESSENTIAL HYPERTENSION: Primary | ICD-10-CM

## 2018-05-15 DIAGNOSIS — Z13.220 ENCOUNTER FOR LIPID SCREENING FOR CARDIOVASCULAR DISEASE: ICD-10-CM

## 2018-05-15 DIAGNOSIS — E66.09 CLASS 1 OBESITY DUE TO EXCESS CALORIES WITH SERIOUS COMORBIDITY AND BODY MASS INDEX (BMI) OF 32.0 TO 32.9 IN ADULT: ICD-10-CM

## 2018-05-15 DIAGNOSIS — Z95.0 PACEMAKER: ICD-10-CM

## 2018-05-15 DIAGNOSIS — F51.09 SITUATIONAL INSOMNIA: ICD-10-CM

## 2018-05-15 PROBLEM — R55 SYNCOPE AND COLLAPSE: Status: RESOLVED | Noted: 2017-03-11 | Resolved: 2018-05-15

## 2018-05-15 PROBLEM — E66.811 CLASS 1 OBESITY DUE TO EXCESS CALORIES WITH SERIOUS COMORBIDITY AND BODY MASS INDEX (BMI) OF 32.0 TO 32.9 IN ADULT: Status: ACTIVE | Noted: 2018-05-15

## 2018-05-15 PROBLEM — I46.9 ASYSTOLE BY ELECTROCARDIOGRAM (HCC): Status: RESOLVED | Noted: 2017-03-11 | Resolved: 2018-05-15

## 2018-05-15 LAB
ALBUMIN SERPL-MCNC: 4.3 G/DL (ref 3.5–5)
ALBUMIN/GLOB SERPL: 1.5 G/DL (ref 1–2)
ALP SERPL-CCNC: 80 U/L (ref 38–126)
ALT SERPL-CCNC: 41 U/L (ref 13–69)
AST SERPL-CCNC: 30 U/L (ref 15–46)
BILIRUB SERPL-MCNC: 0.6 MG/DL (ref 0.2–1.3)
BUN SERPL-MCNC: 21 MG/DL (ref 7–20)
BUN/CREAT SERPL: 21 (ref 6.3–21.9)
CALCIUM SERPL-MCNC: 9.5 MG/DL (ref 8.4–10.2)
CHLORIDE SERPL-SCNC: 104 MMOL/L (ref 98–107)
CHOLEST SERPL-MCNC: 205 MG/DL (ref 0–199)
CO2 SERPL-SCNC: 30 MMOL/L (ref 26–30)
CREAT SERPL-MCNC: 1 MG/DL (ref 0.6–1.3)
CRP SERPL-MCNC: 0.7 MG/DL (ref 0–1)
ERYTHROCYTE [DISTWIDTH] IN BLOOD BY AUTOMATED COUNT: 12.3 % (ref 11.5–14.5)
ERYTHROCYTE [SEDIMENTATION RATE] IN BLOOD BY WESTERGREN METHOD: 0 MM/HR (ref 0–15)
GFR SERPLBLD CREATININE-BSD FMLA CKD-EPI: 78 ML/MIN/1.73
GFR SERPLBLD CREATININE-BSD FMLA CKD-EPI: 95 ML/MIN/1.73
GLOBULIN SER CALC-MCNC: 2.8 GM/DL
GLUCOSE SERPL-MCNC: 85 MG/DL (ref 74–98)
HBA1C MFR BLD: 5.7 %
HCT VFR BLD AUTO: 47.4 % (ref 42–52)
HDLC SERPL-MCNC: 34 MG/DL (ref 40–60)
HGB BLD-MCNC: 15.7 G/DL (ref 14–18)
IRON SERPL-MCNC: 96 MCG/DL (ref 37–181)
LDLC SERPL CALC-MCNC: 145 MG/DL (ref 0–99)
MCH RBC QN AUTO: 30.7 PG (ref 27–31)
MCHC RBC AUTO-ENTMCNC: 33.1 G/DL (ref 30–37)
MCV RBC AUTO: 92.6 FL (ref 80–94)
PLATELET # BLD AUTO: 212 10*3/MM3 (ref 130–400)
POTASSIUM SERPL-SCNC: 4.3 MMOL/L (ref 3.5–5.1)
PROT SERPL-MCNC: 7.1 G/DL (ref 6.3–8.2)
RBC # BLD AUTO: 5.12 10*6/MM3 (ref 4.7–6.1)
SODIUM SERPL-SCNC: 144 MMOL/L (ref 137–145)
TRIGL SERPL-MCNC: 129 MG/DL
TSH SERPL DL<=0.005 MIU/L-ACNC: 2.33 MIU/ML (ref 0.47–4.68)
VLDLC SERPL CALC-MCNC: 25.8 MG/DL
WBC # BLD AUTO: 5.72 10*3/MM3 (ref 4.8–10.8)

## 2018-05-15 PROCEDURE — 99204 OFFICE O/P NEW MOD 45 MIN: CPT | Performed by: FAMILY MEDICINE

## 2018-05-15 RX ORDER — ZOLPIDEM TARTRATE 10 MG/1
TABLET ORAL
Qty: 30 TABLET | Refills: 0 | Status: SHIPPED | OUTPATIENT
Start: 2018-05-15 | End: 2018-10-17 | Stop reason: SDUPTHER

## 2018-05-15 RX ORDER — LISINOPRIL 20 MG/1
20 TABLET ORAL DAILY
COMMUNITY
End: 2018-06-14 | Stop reason: SDUPTHER

## 2018-05-15 RX ORDER — ZOLPIDEM TARTRATE 10 MG/1
TABLET ORAL
Qty: 30 TABLET | Refills: 0 | Status: SHIPPED | OUTPATIENT
Start: 2018-05-15 | End: 2018-05-15 | Stop reason: SDUPTHER

## 2018-05-15 RX ORDER — CITALOPRAM 10 MG/1
10 TABLET ORAL DAILY
COMMUNITY
End: 2018-12-14 | Stop reason: SDUPTHER

## 2018-05-15 NOTE — PROGRESS NOTES
"Subjective   Tom Harvey is a 53 y.o. male.     History of Present Illness  Mr. Harvey presents today as a new patient to establish care.  He was previously followed by primary care provider in [place].    He has hypertension for which he is taking lisinopril.  Blood pressure has been well-controlled.  He exercises intermittently, generally non-formal. Fair job of following low-salt diet.  Has cardiac history including bradycardia/asystole requiring pacemaker placement.  Followed by Dr. Gamez previously; transferring care to Dr. Sethi and has follow-up in July.  He denies palpitations, chest pain.  No syncope.    He is on Celexa for management of irritable bowel symptoms, mainly postprandial diarrhea.  Symptoms improved with SSRI.  Denies blood in stool.  Denies abdominal pain.    He has chronic history of situational insomnia for which he has taken Ambien intermittently.  He denies side effects or any parasomnias with Ambien.  He requests refill for as needed use.    He has chronic obesity and is due for screening for dyslipidemia and diabetes.  He has had sleep study ×2 which did not show sleep apnea per his report.    He does have diffuse joint pain but denies joint stiffness.  He has noticed that when he is tired he tends to \"drag his left foot\".  Not a particular \"footdrop\" but rather a drug of his heel.  No recent fall or injury.  He has previously been evaluated for possible Lyme disease and reports negative workup.  He denies family history of autoimmune disease.  He does have intermittent low back pain, nonradiating, no sciatica.  No new focal neurological symptoms.  Joint pain actually improves with movement and activity.  Seems to worsen after \"prolonged inactivity\".  He does use intermittent dosing of ibuprofen for treatment of pain.    He reports intermittent cough, wheeze, dyspnea on exertion.  He reports having had a PFT showing restrictive pattern.  Symptoms increase with changes in " "temperature.  \"Only wheezes with exertion\".  These symptoms occurred prior to him beginning lisinopril.  He had a reportedly normal heart echo in 2017.  He denies orthopnea.  No peripheral edema.    He also reports some intermittent chronic discomfort in his left arm which he attributes as a residual problem from \"shingles\".  Exact timing of this is unclear.  Denies aaron weakness in the left arm.  He denies neck pain.    The following portions of the patient's history were reviewed and updated as appropriate: allergies, current medications, past family history, past medical history, past social history, past surgical history and problem list.    Review of Systems   Constitutional: Positive for fatigue. Negative for appetite change, fever and unexpected weight change.   HENT: Negative for congestion, ear pain, hearing loss, nosebleeds, rhinorrhea, sneezing, sore throat, tinnitus and trouble swallowing.    Eyes: Negative for pain, discharge, redness and visual disturbance.   Respiratory: Positive for cough, shortness of breath and wheezing. Negative for chest tightness.    Cardiovascular: Negative for chest pain, palpitations and leg swelling.   Gastrointestinal: Negative for abdominal pain, blood in stool, constipation, diarrhea, nausea and vomiting.   Endocrine: Negative for cold intolerance, heat intolerance, polydipsia and polyuria.   Genitourinary: Negative for dysuria, flank pain, frequency, hematuria and urgency.   Musculoskeletal: Positive for arthralgias, back pain and gait problem. Negative for joint swelling, myalgias and neck pain.   Skin: Negative for rash and wound.   Neurological: Negative for dizziness, tremors, seizures, syncope, speech difficulty, weakness, numbness and headaches.   Hematological: Negative for adenopathy. Does not bruise/bleed easily.   Psychiatric/Behavioral: Negative for confusion, dysphoric mood, sleep disturbance and suicidal ideas. The patient is not nervous/anxious.  "       Objective    Vitals:    05/15/18 1013   BP: 122/84   Pulse: 72   SpO2: 98%     Body mass index is 32.64 kg/m².  1    05/15/18  1013   Weight: 100 kg (221 lb)       Physical Exam   Constitutional: He is oriented to person, place, and time. He appears well-developed and well-nourished. He is cooperative. He does not appear ill. No distress.   obese   HENT:   Head: Normocephalic and atraumatic.   Right Ear: Tympanic membrane, external ear and ear canal normal.   Left Ear: Tympanic membrane, external ear and ear canal normal.   Nose: Nose normal. No mucosal edema or rhinorrhea.   Mouth/Throat: Oropharynx is clear and moist and mucous membranes are normal. No oral lesions. No posterior oropharyngeal erythema.   Mallampati class III/IV   Eyes: Conjunctivae, EOM and lids are normal.   Neck: Phonation normal. Neck supple. Normal carotid pulses present. Carotid bruit is not present. No thyromegaly present.   Cardiovascular: Normal rate, regular rhythm, S1 normal, S2 normal and intact distal pulses.  Exam reveals no gallop.    No murmur heard.  No peripheral edema.   Pulmonary/Chest: Effort normal. He has decreased breath sounds (mildly). He has no wheezes. He has no rhonchi. He has no rales.   Abdominal: Soft. He exhibits no distension and no mass (Exam limited by body habitus). Bowel sounds are decreased. There is no hepatosplenomegaly (Exam limited by body habitus). There is no tenderness.   Musculoskeletal: Normal range of motion. He exhibits no edema or deformity.        Lumbar back: He exhibits tenderness (mild soft tissue TTP). He exhibits normal range of motion, no bony tenderness and no deformity.   Lymphadenopathy:     He has no cervical adenopathy.   Neurological: He is alert and oriented to person, place, and time. He has normal strength and normal reflexes. No cranial nerve deficit or sensory deficit.   Skin: Skin is warm, dry and intact. No ecchymosis and no rash noted. He is not diaphoretic. No cyanosis.  No pallor. Nails show no clubbing.   Psychiatric: He has a normal mood and affect. His speech is normal and behavior is normal. Judgment and thought content normal. Cognition and memory are normal.   Nursing note and vitals reviewed.      Assessment/Plan   Tom was seen today for establish care.    Diagnoses and all orders for this visit:    Essential hypertension  -     CBC (No Diff)  -     Comprehensive Metabolic Panel  -     TSH Rfx On Abnormal To Free T4    Irritable bowel syndrome with diarrhea  -     CBC (No Diff)    Situational insomnia  -     TSH Rfx On Abnormal To Free T4  -     Discontinue: zolpidem (AMBIEN) 10 MG tablet; 1/2 to 1 po qhs as needed for sleep  -     zolpidem (AMBIEN) 10 MG tablet; 1/2 to 1 po qhs as needed for sleep    Pacemaker    Screening for diabetes mellitus  -     Hemoglobin A1c    Encounter for lipid screening for cardiovascular disease  -     Lipid Panel    Polyarthralgia  -     CBC (No Diff)  -     Comprehensive Metabolic Panel  -     Iron  -     Sedimentation Rate  -     TSH Rfx On Abnormal To Free T4  -     C-reactive Protein    Class 1 obesity due to excess calories with serious comorbidity and body mass index (BMI) of 32.0 to 32.9 in adult  -     Hemoglobin A1c    Hypertension which is currently well controlled on lisinopril.  He is encouraged to exercise daily, follow heart healthy diet.  Continue lisinopril.  He is aware this may be contributing to his cough.  Does not wish to discontinue at this time we'll continue to monitor.    Postprandial diarrhea likely related to irritable bowel syndrome improved with Celexa.  Patient denies side effects.    Situational insomnia with good response to as needed Ambien.  He is aware of risks/benefits and potential side effects of this medication.    Polyarthralgia, fatigue of unclear etiology.  He is aware chronic obesity can contribute to the symptoms.  He voices understanding of need for working toward healthy BMI including calorie  restriction 1800 kcal per day, regular exercise, avoidance of sugar/simple carbohydrates.    I will contact patient regarding test results and provide instructions regarding any necessary changes in plan of care.  He is encouraged to keep consultation appointment with Dr. Sethi in July.  Records requested from previous primary provider as well as any consulting physician, admitting hospitals, etc. Further recommendations pending review.    Consider follow-up PFT, chest x-ray, chest CT.  He declines referral for colonoscopy but is willing to perform Hemoccult.  Care provided.  Patient was encouraged to keep me informed of any acute changes, lack of improvement, or any new concerning symptoms.  Pt is aware of reasons to seek emergent care.  Patient voiced understanding of all instructions and denied further questions.    As part of patient's treatment plan I am prescribing a controlled substance.  The patient has been made aware of the appropriate use of such medications, including potential risk of somnolence, limited ability to drive and/or work safely, and potential for dependence and/or overdose.  It has also been made clear that these medications are for use by this patient only, without concomitant use of alcohol or other substances, unless prescribed.  ROSALINA report reviewed and scanned into chart.  Last ROSALINA date 5/15/18.

## 2018-05-15 NOTE — TELEPHONE ENCOUNTER
I intended to give patient a Hemoccult card at his appointment (5/15/18). I do not believe he received one.  Please encourage him to  Hemoccult kit at his earliest convenience.

## 2018-06-04 ENCOUNTER — TELEPHONE (OUTPATIENT)
Dept: FAMILY MEDICINE CLINIC | Facility: CLINIC | Age: 53
End: 2018-06-04

## 2018-06-04 NOTE — TELEPHONE ENCOUNTER
"----- Message from Meghan Burrell MD sent at 5/18/2018  8:10 AM EDT -----  Please inform pt his recent labs were normal other than:    High \"bad\" and low \"good\" cholesterol. Based on his current cardiovascular risk factors he would benefit from tx with statin. Decreases risk of heart attack and stroke. Please let me know if he wishes to begin tx. He can always make apt to discuss in more detail.  "

## 2018-06-11 ENCOUNTER — TELEPHONE (OUTPATIENT)
Dept: FAMILY MEDICINE CLINIC | Facility: CLINIC | Age: 53
End: 2018-06-11

## 2018-06-11 DIAGNOSIS — R19.5 HEME POSITIVE STOOL: ICD-10-CM

## 2018-06-11 DIAGNOSIS — Z12.11 COLON CANCER SCREENING: Primary | ICD-10-CM

## 2018-06-12 NOTE — TELEPHONE ENCOUNTER
Please inform pt his recent stool test was positive for blood. I advise he have colonoscopy. Please let me know if he is willing to be referred.

## 2018-06-13 ENCOUNTER — TELEPHONE (OUTPATIENT)
Dept: SURGERY | Facility: CLINIC | Age: 53
End: 2018-06-13

## 2018-06-14 RX ORDER — FLUTICASONE PROPIONATE 50 MCG
2 SPRAY, SUSPENSION (ML) NASAL DAILY
Qty: 1 BOTTLE | Refills: 1 | Status: SHIPPED | OUTPATIENT
Start: 2018-06-14 | End: 2018-08-10 | Stop reason: SDUPTHER

## 2018-06-14 RX ORDER — LISINOPRIL 20 MG/1
20 TABLET ORAL DAILY
Qty: 30 TABLET | Refills: 1 | Status: SHIPPED | OUTPATIENT
Start: 2018-06-14 | End: 2018-08-10 | Stop reason: SDUPTHER

## 2018-06-14 NOTE — TELEPHONE ENCOUNTER
PATIENT IS REQUESTING REFILL OF FLONASE AND LISINIPRIL TO BE SENT TO [facility] IN [place]. IF YOU HAVE ANY QUESTIONS PLEASE CONACT PATIENT.

## 2018-06-17 PROBLEM — E78.5 DYSLIPIDEMIA: Status: ACTIVE | Noted: 2018-06-17

## 2018-06-17 PROBLEM — E53.8 VITAMIN B 12 DEFICIENCY: Status: ACTIVE | Noted: 2018-06-17

## 2018-06-17 PROBLEM — E29.1 HYPOGONADISM IN MALE: Status: ACTIVE | Noted: 2018-06-17

## 2018-06-25 ENCOUNTER — PREP FOR SURGERY (OUTPATIENT)
Dept: OTHER | Facility: HOSPITAL | Age: 53
End: 2018-06-25

## 2018-06-25 DIAGNOSIS — Z12.11 ENCOUNTER FOR SCREENING COLONOSCOPY: Primary | ICD-10-CM

## 2018-06-25 RX ORDER — POLYETHYLENE GLYCOL 3350 17 G/17G
238 POWDER, FOR SOLUTION ORAL ONCE
Qty: 14 PACKET | Refills: 0 | Status: SHIPPED | OUTPATIENT
Start: 2018-06-25 | End: 2018-06-25

## 2018-06-25 RX ORDER — BISACODYL 5 MG/1
5 TABLET, DELAYED RELEASE ORAL DAILY PRN
Qty: 4 TABLET | Refills: 0 | Status: SHIPPED | OUTPATIENT
Start: 2018-06-25 | End: 2018-06-26

## 2018-06-25 NOTE — TELEPHONE ENCOUNTER
Per Dr Silverio go ahead and schedule open access. The patient is scheduled for Friday, July 6 at [facility]. [place] am [place] [facility] and mailing instructions to the patient.

## 2018-06-26 PROBLEM — Z12.11 ENCOUNTER FOR SCREENING COLONOSCOPY: Status: ACTIVE | Noted: 2018-06-26

## 2018-07-06 ENCOUNTER — ANESTHESIA EVENT (OUTPATIENT)
Dept: GASTROENTEROLOGY | Facility: HOSPITAL | Age: 53
End: 2018-07-06

## 2018-07-06 ENCOUNTER — HOSPITAL ENCOUNTER (OUTPATIENT)
Facility: HOSPITAL | Age: 53
Setting detail: HOSPITAL OUTPATIENT SURGERY
Discharge: HOME | End: 2018-07-06
Attending: SURGERY | Admitting: SURGERY

## 2018-07-06 ENCOUNTER — ANESTHESIA (OUTPATIENT)
Dept: GASTROENTEROLOGY | Facility: HOSPITAL | Age: 53
End: 2018-07-06

## 2018-07-06 VITALS
BODY MASS INDEX: 31.21 KG/M2 | RESPIRATION RATE: 18 BRPM | WEIGHT: 218 LBS | HEART RATE: 78 BPM | OXYGEN SATURATION: 96 % | HEIGHT: 70 IN | TEMPERATURE: 97.7 F | DIASTOLIC BLOOD PRESSURE: 70 MMHG | SYSTOLIC BLOOD PRESSURE: 106 MMHG

## 2018-07-06 PROCEDURE — S0260 H&P FOR SURGERY: HCPCS | Performed by: SURGERY

## 2018-07-06 PROCEDURE — 25010000002 PROPOFOL 10 MG/ML EMULSION: Performed by: NURSE ANESTHETIST, CERTIFIED REGISTERED

## 2018-07-06 PROCEDURE — 25010000002 ONDANSETRON PER 1 MG: Performed by: NURSE ANESTHETIST, CERTIFIED REGISTERED

## 2018-07-06 PROCEDURE — 25010000002 PROPOFOL 1000 MG/ML EMULSION: Performed by: NURSE ANESTHETIST, CERTIFIED REGISTERED

## 2018-07-06 RX ORDER — PROPOFOL 10 MG/ML
VIAL (ML) INTRAVENOUS AS NEEDED
Status: DISCONTINUED | OUTPATIENT
Start: 2018-07-06 | End: 2018-07-06 | Stop reason: SURG

## 2018-07-06 RX ORDER — SODIUM CHLORIDE, SODIUM LACTATE, POTASSIUM CHLORIDE, CALCIUM CHLORIDE 600; 310; 30; 20 MG/100ML; MG/100ML; MG/100ML; MG/100ML
1000 INJECTION, SOLUTION INTRAVENOUS CONTINUOUS
Status: DISCONTINUED | OUTPATIENT
Start: 2018-07-06 | End: 2018-07-06 | Stop reason: HOSPADM

## 2018-07-06 RX ORDER — ONDANSETRON 2 MG/ML
INJECTION INTRAMUSCULAR; INTRAVENOUS AS NEEDED
Status: DISCONTINUED | OUTPATIENT
Start: 2018-07-06 | End: 2018-07-06 | Stop reason: SURG

## 2018-07-06 RX ORDER — ONDANSETRON 2 MG/ML
4 INJECTION INTRAMUSCULAR; INTRAVENOUS ONCE AS NEEDED
Status: DISCONTINUED | OUTPATIENT
Start: 2018-07-06 | End: 2018-07-06 | Stop reason: HOSPADM

## 2018-07-06 RX ORDER — SODIUM CHLORIDE 0.9 % (FLUSH) 0.9 %
3 SYRINGE (ML) INJECTION AS NEEDED
Status: DISCONTINUED | OUTPATIENT
Start: 2018-07-06 | End: 2018-07-06 | Stop reason: HOSPADM

## 2018-07-06 RX ADMIN — ONDANSETRON 4 MG: 2 INJECTION INTRAMUSCULAR; INTRAVENOUS at 08:12

## 2018-07-06 RX ADMIN — PROPOFOL 60 MG: 10 INJECTION, EMULSION INTRAVENOUS at 08:12

## 2018-07-06 RX ADMIN — LIDOCAINE HYDROCHLORIDE 80 MG: 20 INJECTION, SOLUTION INTRAVENOUS at 08:12

## 2018-07-06 RX ADMIN — SODIUM CHLORIDE, POTASSIUM CHLORIDE, SODIUM LACTATE AND CALCIUM CHLORIDE 1000 ML: 600; 310; 30; 20 INJECTION, SOLUTION INTRAVENOUS at 07:07

## 2018-07-06 RX ADMIN — PROPOFOL 120 MCG/KG/MIN: 10 INJECTION, EMULSION INTRAVENOUS at 08:13

## 2018-07-06 NOTE — ANESTHESIA POSTPROCEDURE EVALUATION
Patient: Tom Harvey    Procedure Summary     Date:  07/06/18 Room / Location:  [facility] ENDOSCOPY 3 / [facility] ENDOSCOPY    Anesthesia Start:  0809 Anesthesia Stop:      Procedure:  COLONOSCOPY (N/A Anus) Diagnosis:       Encounter for screening colonoscopy      (Encounter for screening colonoscopy [Z12.11])    Surgeon:  Hong Silverio MD Provider:  Andi Carver CRNA    Anesthesia Type:  MAC ASA Status:  3          Anesthesia Type: MAC  Last vitals  BP   110/64   Temp   97.6   Pulse   74   Resp   19   SpO2   96     Post Anesthesia Care and Evaluation    Patient location during evaluation: bedside  Patient participation: complete - patient participated  Level of consciousness: awake and alert  Pain score: 0  Pain management: adequate  Airway patency: patent  Anesthetic complications: No anesthetic complications  PONV Status: none  Cardiovascular status: acceptable  Respiratory status: acceptable and nasal cannula  Hydration status: acceptable

## 2018-07-06 NOTE — ANESTHESIA PREPROCEDURE EVALUATION
Anesthesia Evaluation     Patient summary reviewed and Nursing notes reviewed   no history of anesthetic complications:  NPO Solid Status: > 8 hours  NPO Liquid Status: > 8 hours           Airway   Mallampati: I  TM distance: >3 FB  Neck ROM: full  no difficulty expected  Dental - normal exam     Pulmonary - negative pulmonary ROS and normal exam   Cardiovascular - normal exam    Rhythm: regular  Rate: normal    (+) pacemaker pacemaker, hypertension well controlled less than 2 medications,     ROS comment: Echo 2017  Interpretation Summary     · Left ventricular function is hyperdynamic (EF > 70).  · The cardiac valves are anatomically and functionally normal        Neuro/Psych  GI/Hepatic/Renal/Endo    (+) obesity,       Musculoskeletal (-) negative ROS    Abdominal  - normal exam    Abdomen: soft.  Bowel sounds: normal.   Substance History - negative use     OB/GYN negative ob/gyn ROS         Other - negative ROS                       Anesthesia Plan    ASA 3     MAC   (Risks and benefits discussed including risk of aspiration, recall and dental damage. All patient questions answered. Will continue with POC.)  intravenous induction   Anesthetic plan and risks discussed with patient.

## 2018-07-06 NOTE — DISCHARGE INSTRUCTIONS
No pushing, pulling, tugging,  heavy lifting, or strenuous activity.  No major decision making, driving, or drinking alcoholic beverages for 24 hours. ( due to the medications you have  received)  Always use good hand hygiene/washing techniques.  NO driving while taking pain medications.    To assist you in voiding:  Drink plenty of fluids  Listen to running water while attempting to void.    If you are unable to urinate and you have an uncomfortable urge to void or it has been   6 hours since you were discharged, return to the Emergency Room

## 2018-07-06 NOTE — H&P
[facility]   HISTORY AND PHYSICAL      Name:  Tom Harvey   Age:  53 y.o.  Sex:  male  :  1965  MRN:  [id number]   Visit Number:  [id number]  Admission Date:  2018  Date Of Service:  18  Primary Care Physician:  Meghan Burrell MD    Chief Complaint:     I need a colonoscopy    History Of Presenting Illness:      As had a colonoscopy.  Referred for screening colonoscopy.  Was stool guaiac positive.  No Family history of colon cancer    Review Of Systems:     General ROS: Patient denies any fevers, chills or loss of consciousness.  No complaints of generalized weakness  Psychological ROS: Denies any hallucinations and delusions.  Respiratory ROS: Denies cough or shortness of breath.   Cardiovascular ROS: Denies chest pain or palpitations. No history of exertional chest pain.   Gastrointestinal ROS: Denies nausea and vomiting. Denies any abdominal pain. No diarrhea.   Genito-Urinary ROS: Denies dysuria or hematuria.  Neurological ROS: Denies any focal weakness. No loss of consciousness. Denies any numbness.   Dermatological ROS: Denies any redness or pruritis.     Past Medical History:    Past Medical History:   Diagnosis Date   • Asystole by electrocardiogram (CMS/[facility]) 1999    PAC   • Hypertension    • Pacemaker    • Syncope and collapse 3/11/2017   • Wears glasses        Past Surgical history:    Past Surgical History:   Procedure Laterality Date   • CARDIAC ELECTROPHYSIOLOGY PROCEDURE N/A 3/13/2017    Procedure: Pacemaker DC new;  Surgeon: Be Barron MD;  Location: [facility] INVASIVE LOCATION;  Service:    • KNEE ARTHROPLASTY UNICOMPARTMENTAL BILATERAL     • PACEMAKER IMPLANTATION         Social History:    Social History     Social History   • Marital status:      Spouse name: N/A   • Number of children: N/A   • Years of education: N/A     Occupational History   • Not on file.     Social History Main Topics   • Smoking status: Never Smoker    • Smokeless tobacco: Never Used   • Alcohol use No   • Drug use: No   • Sexual activity: Defer     Other Topics Concern   • Not on file     Social History Narrative    . Respiratory therapist at [employer]           Family History:    Family History   Problem Relation Age of Onset   • Stroke Mother 43   • Heart disease Father    • Dysphagia Father        Allergies:      Levaquin [levofloxacin]    Home Medications:    Prior to Admission Medications     Prescriptions Last Dose Informant Patient Reported? Taking?    citalopram (CeleXA) 10 MG tablet 7/6/2018  Yes Yes    Take 10 mg by mouth Daily.    fluticasone (FLONASE) 50 MCG/ACT nasal spray 7/6/2018  No Yes    2 sprays into each nostril Daily.    lisinopril (PRINIVIL,ZESTRIL) 20 MG tablet 7/6/2018  No Yes    Take 1 tablet by mouth Daily.    zolpidem (AMBIEN) 10 MG tablet Past Week  No Yes    1/2 to 1 po qhs as needed for sleep             ED Medications:    Medications   sodium chloride 0.9 % flush 3 mL (not administered)   lactated ringers infusion 1,000 mL (1,000 mL Intravenous New Bag 7/6/18 0707)       Vital Signs:    Temp:  [97.9 °F (36.6 °C)] 97.9 °F (36.6 °C)  Heart Rate:  [82] 82  Resp:  [18] 18  BP: (127)/(89) 127/89    1    07/02/18  0954   Weight: 98.9 kg (218 lb)       Body mass index is 31.28 kg/m².    Physical Exam:      General Appearance:  Alert and cooperative, not in any acute distress.   Head:  Atraumatic and normocephalic, without obvious abnormality.   Eyes:          PERRLA, conjunctivae and sclerae normal, no Icterus. No pallor. Extra-occular movements are within normal limits.   Ears:  Ears appear intact with no abnormalities noted.   Respiratory/Lungs:   Breath sounds heard bilaterally equally.  No crackles or wheezing. No Pleural rub or bronchial breathing. Normal respiratory effort.    Cardiovascular/Heart:  Normal S1 and S2,    GI/Abdomen:   No masses, no hepatosplenomegaly. Soft, non-tender, non-distended, no hernia                  Musculoskeletal/ Extremities:   Moves all extremities well   Skin: No bleeding, bruising or rash, no induration   Psychiatric : Alert and oriented ×3.  No depression or anxiety    Neurologic: Cranial nerves 2 - 12 grossly intact, sensation intact, Motor power is normal and equal bilaterally.       EKG:          Labs:    Lab Results (last 24 hours)     ** No results found for the last 24 hours. **          Radiology:    Imaging Results (last 72 hours)     ** No results found for the last 72 hours. **          Assessment:    Screening colonoscopy     Plan:     I recommend a screening colonoscopy to the patient.  The patient understands the procedure and the reason for the procedure.  The patient also understands the risks which include but are not limited to bleeding and perforation and they understand the ramifications of these potential complications and wish to proceed.    Hong Silverio MD  07/06/18  8:10 AM

## 2018-07-24 ENCOUNTER — OFFICE VISIT (OUTPATIENT)
Dept: CARDIOLOGY | Facility: CLINIC | Age: 53
End: 2018-07-24

## 2018-07-24 VITALS
DIASTOLIC BLOOD PRESSURE: 82 MMHG | BODY MASS INDEX: 31.07 KG/M2 | SYSTOLIC BLOOD PRESSURE: 130 MMHG | HEART RATE: 75 BPM | WEIGHT: 217 LBS | HEIGHT: 70 IN

## 2018-07-24 DIAGNOSIS — R00.1 BRADYCARDIA BY ELECTROCARDIOGRAM: ICD-10-CM

## 2018-07-24 PROCEDURE — 99213 OFFICE O/P EST LOW 20 MIN: CPT | Performed by: INTERNAL MEDICINE

## 2018-07-24 PROCEDURE — 93280 PM DEVICE PROGR EVAL DUAL: CPT | Performed by: INTERNAL MEDICINE

## 2018-07-24 NOTE — PROGRESS NOTES
Tom Harvey  1965  PCP: Meghan Burrell MD    SUBJECTIVE:   Tom Harvey is a 53 y.o. male seen for a follow up visit regarding the following:     Chief Complaint: Follow up for Syncope/Bradycardia    HPI:    Since last visit the patient's status has been stable. No recurrent syncopal events.     History:       Cardiac PMH: (Old records have been reviewed and summarized below)  1. 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 syncope in setting of #1  3. hypertension  4. Fatigue, chronic    Current Outpatient Prescriptions:   •  citalopram (CeleXA) 10 MG tablet, Take 10 mg by mouth Daily., Disp: , Rfl:   •  fluticasone (FLONASE) 50 MCG/ACT nasal spray, 2 sprays into each nostril Daily., Disp: 1 bottle, Rfl: 1  •  lisinopril (PRINIVIL,ZESTRIL) 20 MG tablet, Take 1 tablet by mouth Daily., Disp: 30 tablet, Rfl: 1  •  zolpidem (AMBIEN) 10 MG tablet, 1/2 to 1 po qhs as needed for sleep, Disp: 30 tablet, Rfl: 0    Past Medical History, Past Surgical History, Family history, Social History, and Medications were all reviewed with the patient today and updated as necessary.       Patient Active Problem List   Diagnosis   • Bradycardia by electrocardiogram   • S/P placement of cardiac pacemaker   • Pacemaker   • Class 1 obesity due to excess calories with serious comorbidity and body mass index (BMI) of 32.0 to 32.9 in adult   • Polyarthralgia   • Situational insomnia   • Irritable bowel syndrome with diarrhea   • Essential hypertension   • Dyslipidemia   • Hypogonadism in male   • Vitamin B 12 deficiency   • Encounter for screening colonoscopy     Allergies   Allergen Reactions   • Levaquin [Levofloxacin] Hives     Past Medical History:   Diagnosis Date   • Asystole by electrocardiogram (CMS/[facility]) 08/19/1999    PAC   • Hypertension    • Pacemaker    • Syncope and collapse 3/11/2017   • Wears glasses      Past Surgical History:   Procedure Laterality Date   • CARDIAC  "ELECTROPHYSIOLOGY PROCEDURE N/A 3/13/2017    Procedure: Pacemaker DC new;  Surgeon: Be Barron MD;  Location:  [facility] INVASIVE LOCATION;  Service:    • COLONOSCOPY N/A 7/6/2018    Procedure: COLONOSCOPY;  Surgeon: Hong Silverio MD;  Location: [facility] ENDOSCOPY;  Service: Gastroenterology   • KNEE ARTHROPLASTY UNICOMPARTMENTAL BILATERAL     • PACEMAKER IMPLANTATION       Family History   Problem Relation Age of Onset   • Stroke Mother 43   • Heart disease Father    • Dysphagia Father      Social History   Substance Use Topics   • Smoking status: Never Smoker   • Smokeless tobacco: Never Used   • Alcohol use No         PHYSICAL EXAM:    /82 (BP Location: Right arm, Patient Position: Sitting)   Pulse 75   Ht 177.8 cm (70\")   Wt 98.4 kg (217 lb)   BMI 31.14 kg/m²        Wt Readings from Last 5 Encounters:   07/24/18 98.4 kg (217 lb)   07/02/18 98.9 kg (218 lb)   05/15/18 100 kg (221 lb)   02/18/18 97.1 kg (214 lb)   12/28/17 99 kg (218 lb 3.2 oz)       BP Readings from Last 5 Encounters:   07/24/18 130/82   07/06/18 106/70   05/15/18 122/84   02/18/18 120/88   12/28/17 132/60       General-Well Nourished, Well developed  Eyes - PERRLA  Neck- supple, No mass  CV- regular rate and rhythm, no MRG, No edema  Lung- clear bilaterally  Abd- soft, +BS  Musc/skel - Norm strength and range of motion  Skin- warm and dry  Neuro - Alert & Oriented x 3, appropriate mood.        Medical problems and test results were reviewed with the patient today.     No results found for this or any previous visit (from the past 672 hour(s)).      EKG: (EKG has been independently visualized by me and summarized below)    Procedures     ASSESSMENT and PLAN  1. Sinus arrest s/p implantation of BTK DDD PPM  2. Syncope- no recurrence  3. HTN- stable  4. Pacemaker - Stable function        Return in about 1 year (around 7/24/2019) for office visit and [facility]        Benjamín Chaparro M.D., F.A.C.C, " [place].  Cardiology/Electrophysiology  7/24/2018  3:54 PM

## 2018-08-10 RX ORDER — FLUTICASONE PROPIONATE 50 MCG
SPRAY, SUSPENSION (ML) NASAL
Qty: 1 BOTTLE | Refills: 5 | Status: SHIPPED | OUTPATIENT
Start: 2018-08-10 | End: 2019-02-02 | Stop reason: SDUPTHER

## 2018-08-10 RX ORDER — LISINOPRIL 20 MG/1
TABLET ORAL
Qty: 30 TABLET | Refills: 0 | Status: SHIPPED | OUTPATIENT
Start: 2018-08-10 | End: 2018-09-06 | Stop reason: SDUPTHER

## 2018-09-04 ENCOUNTER — CLINICAL SUPPORT NO REQUIREMENTS (OUTPATIENT)
Dept: CARDIOLOGY | Facility: CLINIC | Age: 53
End: 2018-09-04

## 2018-09-04 DIAGNOSIS — R00.1 BRADYCARDIA BY ELECTROCARDIOGRAM: ICD-10-CM

## 2018-09-04 PROCEDURE — 93296 REM INTERROG EVL PM/IDS: CPT | Performed by: INTERNAL MEDICINE

## 2018-09-04 PROCEDURE — 93294 REM INTERROG EVL PM/LDLS PM: CPT | Performed by: INTERNAL MEDICINE

## 2018-09-06 RX ORDER — LISINOPRIL 20 MG/1
TABLET ORAL
Qty: 30 TABLET | Refills: 5 | Status: SHIPPED | OUTPATIENT
Start: 2018-09-06 | End: 2019-03-06 | Stop reason: SDUPTHER

## 2018-10-17 DIAGNOSIS — F51.09 SITUATIONAL INSOMNIA: ICD-10-CM

## 2018-10-18 RX ORDER — ZOLPIDEM TARTRATE 10 MG/1
TABLET ORAL
Qty: 30 TABLET | Refills: 2 | Status: SHIPPED | OUTPATIENT
Start: 2018-10-18 | End: 2019-04-26 | Stop reason: SDUPTHER

## 2018-10-18 NOTE — TELEPHONE ENCOUNTER
Pt informed rx sent into pharmacy. Pt did not have a routine f/u scheduled, so one was made at that time as well.

## 2018-10-18 NOTE — TELEPHONE ENCOUNTER
ROSALINA reviewed. Refill approved. Medication E rx'd to pharmacy as requested. Pt to keep f/u apt as scheduled.

## 2018-12-11 ENCOUNTER — CLINICAL SUPPORT NO REQUIREMENTS (OUTPATIENT)
Dept: CARDIOLOGY | Facility: CLINIC | Age: 53
End: 2018-12-11

## 2018-12-11 DIAGNOSIS — R00.1 BRADYCARDIA BY ELECTROCARDIOGRAM: Primary | ICD-10-CM

## 2018-12-11 PROCEDURE — 93296 REM INTERROG EVL PM/IDS: CPT | Performed by: INTERNAL MEDICINE

## 2018-12-11 PROCEDURE — 93294 REM INTERROG EVL PM/LDLS PM: CPT | Performed by: INTERNAL MEDICINE

## 2018-12-14 ENCOUNTER — OFFICE VISIT (OUTPATIENT)
Dept: FAMILY MEDICINE CLINIC | Facility: CLINIC | Age: 53
End: 2018-12-14

## 2018-12-14 VITALS
OXYGEN SATURATION: 98 % | HEART RATE: 71 BPM | SYSTOLIC BLOOD PRESSURE: 128 MMHG | WEIGHT: 224 LBS | BODY MASS INDEX: 32.07 KG/M2 | HEIGHT: 70 IN | DIASTOLIC BLOOD PRESSURE: 80 MMHG

## 2018-12-14 DIAGNOSIS — Z11.59 NEED FOR HEPATITIS C SCREENING TEST: ICD-10-CM

## 2018-12-14 DIAGNOSIS — I10 ESSENTIAL HYPERTENSION: ICD-10-CM

## 2018-12-14 DIAGNOSIS — R53.82 CHRONIC FATIGUE: ICD-10-CM

## 2018-12-14 DIAGNOSIS — R73.09 ELEVATED HEMOGLOBIN A1C: ICD-10-CM

## 2018-12-14 DIAGNOSIS — M54.9 UPPER BACK PAIN: ICD-10-CM

## 2018-12-14 DIAGNOSIS — R20.2 ARM PARESTHESIA, LEFT: ICD-10-CM

## 2018-12-14 DIAGNOSIS — M54.2 NECK PAIN: Primary | ICD-10-CM

## 2018-12-14 DIAGNOSIS — F51.09 SITUATIONAL INSOMNIA: ICD-10-CM

## 2018-12-14 DIAGNOSIS — E53.8 VITAMIN B 12 DEFICIENCY: ICD-10-CM

## 2018-12-14 LAB
BASOPHILS # BLD AUTO: 0.04 10*3/MM3 (ref 0–0.2)
BASOPHILS NFR BLD AUTO: 0.5 % (ref 0–2.5)
EOSINOPHIL # BLD AUTO: 0.26 10*3/MM3 (ref 0–0.7)
EOSINOPHIL NFR BLD AUTO: 3.4 % (ref 0–7)
ERYTHROCYTE [DISTWIDTH] IN BLOOD BY AUTOMATED COUNT: 12.3 % (ref 11.5–14.5)
HBA1C MFR BLD: 5.8 %
HCT VFR BLD AUTO: 46.9 % (ref 42–52)
HGB BLD-MCNC: 15.6 G/DL (ref 14–18)
IMM GRANULOCYTES # BLD: 0.01 10*3/MM3 (ref 0–0.06)
IMM GRANULOCYTES NFR BLD: 0.1 % (ref 0–0.6)
IRON SERPL-MCNC: 113 MCG/DL (ref 37–181)
LYMPHOCYTES # BLD AUTO: 2.12 10*3/MM3 (ref 0.6–3.4)
LYMPHOCYTES NFR BLD AUTO: 27.3 % (ref 10–50)
MCH RBC QN AUTO: 30.5 PG (ref 27–31)
MCHC RBC AUTO-ENTMCNC: 33.3 G/DL (ref 30–37)
MCV RBC AUTO: 91.8 FL (ref 80–94)
MONOCYTES # BLD AUTO: 0.65 10*3/MM3 (ref 0–0.9)
MONOCYTES NFR BLD AUTO: 8.4 % (ref 0–12)
NEUTROPHILS # BLD AUTO: 4.68 10*3/MM3 (ref 2–6.9)
NEUTROPHILS NFR BLD AUTO: 60.3 % (ref 37–80)
NRBC BLD AUTO-RTO: 0 /100 WBC (ref 0–0)
PLATELET # BLD AUTO: 223 10*3/MM3 (ref 130–400)
RBC # BLD AUTO: 5.11 10*6/MM3 (ref 4.7–6.1)
VIT B12 SERPL-MCNC: 372 PG/ML (ref 239–931)
WBC # BLD AUTO: 7.76 10*3/MM3 (ref 4.8–10.8)

## 2018-12-14 PROCEDURE — 99214 OFFICE O/P EST MOD 30 MIN: CPT | Performed by: FAMILY MEDICINE

## 2018-12-14 RX ORDER — PREDNISONE 20 MG/1
TABLET ORAL
Qty: 14 TABLET | Refills: 0 | Status: SHIPPED | OUTPATIENT
Start: 2018-12-14 | End: 2019-07-30

## 2018-12-14 RX ORDER — CITALOPRAM 10 MG/1
10 TABLET ORAL DAILY
Qty: 90 TABLET | Refills: 1 | Status: SHIPPED | OUTPATIENT
Start: 2018-12-14 | End: 2019-06-09 | Stop reason: SDUPTHER

## 2018-12-14 NOTE — PROGRESS NOTES
Subjective   Tom Harvey is a 53 y.o. male.     History of Present Illness  Mr. Harvey presents today with c/o neck pain, upper back pain and left arm pain. approx 18 months ago he developed burning pain in left side of neck, upper back which radiated down left arm. Felt to be due to shingles (minimal rash outbreak). Took steroids and pain was temporarily better. For 1 month he has noticed steady worsening of similar symptoms. He has a drawing/catch sensation in left neck, burning pain inner left upper arm, aching pain behind left shoulder blade/upper back which radiates down left arm. No change in activity or injury. Some relief with lying on left shoulder (pressure). Pain also relieved with passively raising left arm over head. He is right hand dominant. He does feel left arm is weaker than right.     Since last visit he has had f/u with cardiology. No changes to meds/status.  He has also had colonoscopy which went well.    He has HTN forwhich he is on ace-inh. BP is well controlled. Denies side effects from meds.    He has anxiety and insomnia for which he is on celexa. Doing well; needs refill. Denies depression    Noted to have low B12 and high A1c last labs, due for recheck.      The following portions of the patient's history were reviewed and updated as appropriate: allergies, current medications, past family history, past medical history, past social history, past surgical history and problem list.    Review of Systems   Constitutional: Positive for fatigue. Negative for appetite change, fever and unexpected weight change.   HENT: Negative for congestion, ear pain, hearing loss, nosebleeds, rhinorrhea, sneezing, sore throat, tinnitus and trouble swallowing.    Eyes: Negative for pain, discharge, redness and visual disturbance.   Respiratory: Negative for cough, chest tightness, shortness of breath and wheezing.    Cardiovascular: Negative for chest pain, palpitations and leg swelling.    Gastrointestinal: Negative for abdominal pain, blood in stool, constipation, diarrhea, nausea and vomiting.   Endocrine: Negative for cold intolerance, heat intolerance, polydipsia and polyuria.   Genitourinary: Negative for dysuria, flank pain, frequency, hematuria and urgency.   Musculoskeletal: Positive for arthralgias, back pain, gait problem, neck pain and neck stiffness. Negative for joint swelling and myalgias.   Skin: Negative for rash and wound.   Neurological: Positive for weakness and numbness. Negative for dizziness, tremors, seizures, syncope, speech difficulty and headaches.   Hematological: Negative for adenopathy. Does not bruise/bleed easily.   Psychiatric/Behavioral: Negative for confusion, dysphoric mood, sleep disturbance and suicidal ideas. The patient is not nervous/anxious.        Objective    Vitals:    12/14/18 1114   BP: 128/80   Pulse: 71   SpO2: 98%     Body mass index is 32.14 kg/m².      12/14/18  1114   Weight: 102 kg (224 lb)       Physical Exam   Constitutional: He is oriented to person, place, and time. He appears well-developed and well-nourished. He is cooperative. He does not appear ill. No distress.   obese   HENT:   Head: Normocephalic and atraumatic.   Mouth/Throat: Oropharynx is clear and moist and mucous membranes are normal. No oral lesions. No posterior oropharyngeal erythema.   Mallampati class III/IV   Eyes: Conjunctivae and lids are normal.   Neck: Phonation normal. Neck supple.   Cardiovascular: Normal rate, regular rhythm, S1 normal, S2 normal and intact distal pulses. Exam reveals no gallop.   No murmur heard.  Pulmonary/Chest: Effort normal and breath sounds normal. He has no wheezes. He has no rhonchi. He has no rales.   Musculoskeletal: He exhibits no edema.        Left shoulder: He exhibits decreased range of motion (pain at 120 deg flex, 90 deg abduction, no pain with passive ROM, neg impingement, neg crossover, nomral external/internal rotation). He exhibits  no tenderness, no bony tenderness, no swelling, no spasm, normal pulse and normal strength.        Cervical back: He exhibits decreased range of motion (decreased left lateral rotation, decreased left/right lateral flexion, normal extension/flexion), tenderness (mild diffuse soft tissue), bony tenderness (C7) and deformity (loss of normal lordosis).        Thoracic back: He exhibits tenderness, deformity (increased kyphosis) and spasm (on left). He exhibits no bony tenderness.        Back:      Vascular Status -  His right foot exhibits no edema. His left foot exhibits no edema.  Neurological: He is alert and oriented to person, place, and time. He has normal strength. He displays no tremor. No cranial nerve deficit or sensory deficit. Gait normal.   Reflex Scores:       Tricep reflexes are 1+ on the right side and 1+ on the left side.       Bicep reflexes are 1+ on the right side and 1+ on the left side.       Brachioradialis reflexes are 1+ on the right side and 1+ on the left side.  Skin: Skin is warm and dry. No ecchymosis and no rash noted.   Psychiatric: He has a normal mood and affect. His behavior is normal. Cognition and memory are normal.   Good eye contact. Answers questions appropriately. Good personal hygiene and grooming.   Nursing note and vitals reviewed.      Assessment/Plan   Tom was seen today for neck pain, hyperlipidemia and hypertension.    Diagnoses and all orders for this visit:    Neck pain  -     XR Spine Cervical 2 or 3 View; Future    Arm paresthesia, left    Upper back pain    Essential hypertension    Vitamin B 12 deficiency  -     CBC & Differential  -     Vitamin B12    Situational insomnia    Chronic fatigue  -     CBC & Differential  -     Iron    Elevated hemoglobin A1c  -     Hemoglobin A1c    Need for hepatitis C screening test  -     Hepatitis C Antibody    Other orders  -     citalopram (CeleXA) 10 MG tablet; Take 1 tablet by mouth Daily.  -     predniSONE (DELTASONE) 20 MG  tablet; 3 po qd x 2 days, then 2 po qd x 2 days, then 1 po qd x 2 days, then 1/2 po qd x 4 days      Reassess status of pre-DM, tx as indicated. In order to prevent diabetes, he is instructed to:  1) increase physical activity to 150 min per week  2) avoid fast foods, junk foods, sweets, sugar beverages  3) increase vegetables, lean proteins, whole grains and fruits    Assess status of vit/min deficiencies and replace as indicated.    HTN controlled.    Anxiety with siutational insomnia controlled.    Left arm pain, upper back pain, neck pain of prolonged course without known injury/inciting event. Possibly multifactorial. Xray as above. Short course of corticosteroids as this has helped previously. May need further imaging, NCS/EMG, PT, etc.    I will contact patient regarding test results and provide instructions regarding any necessary changes in plan of care.  Routine f/u in 3 months, sooner as needed/instructed.  Patient was encouraged to keep me informed of any acute changes, lack of improvement, or any new concerning symptoms.  Pt is aware of reasons to seek emergent care.  Patient voiced understanding of all instructions and denied further questions.

## 2018-12-17 ENCOUNTER — HOSPITAL ENCOUNTER (OUTPATIENT)
Dept: GENERAL RADIOLOGY | Facility: HOSPITAL | Age: 53
Discharge: HOME | End: 2018-12-17
Admitting: FAMILY MEDICINE

## 2018-12-17 DIAGNOSIS — M54.2 NECK PAIN: ICD-10-CM

## 2018-12-17 PROCEDURE — 72040 X-RAY EXAM NECK SPINE 2-3 VW: CPT

## 2018-12-20 ENCOUNTER — TELEPHONE (OUTPATIENT)
Dept: FAMILY MEDICINE CLINIC | Facility: CLINIC | Age: 53
End: 2018-12-20

## 2018-12-20 DIAGNOSIS — M50.30 DDD (DEGENERATIVE DISC DISEASE), CERVICAL: Primary | ICD-10-CM

## 2018-12-20 DIAGNOSIS — M47.22 OSTEOARTHRITIS OF SPINE WITH RADICULOPATHY, CERVICAL REGION: ICD-10-CM

## 2018-12-20 NOTE — TELEPHONE ENCOUNTER
I informed patient and he would like to do a MRI and wanted to remind you francesca he dose have a pace maker, but its one that's suppose to be safe to do MRI's

## 2018-12-20 NOTE — TELEPHONE ENCOUNTER
----- Message from Meghan Burrell MD sent at 12/18/2018  1:58 PM EST -----  Please inform pt his recent neck xray showed arthritis as well as decreased disc space height in lower neck. This could be causing his current symptoms. There are multiple possible next steps including MRI vs nerve conduction test vs trial of PT. Please let me know if he has a preference.

## 2018-12-21 PROBLEM — M47.22 OSTEOARTHRITIS OF SPINE WITH RADICULOPATHY, CERVICAL REGION: Status: ACTIVE | Noted: 2018-12-21

## 2018-12-21 PROBLEM — M50.30 DDD (DEGENERATIVE DISC DISEASE), CERVICAL: Status: ACTIVE | Noted: 2018-12-21

## 2018-12-26 ENCOUNTER — HOSPITAL ENCOUNTER (OUTPATIENT)
Dept: MRI IMAGING | Facility: HOSPITAL | Age: 53
Discharge: HOME | End: 2018-12-26

## 2018-12-26 DIAGNOSIS — M47.22 OSTEOARTHRITIS OF SPINE WITH RADICULOPATHY, CERVICAL REGION: ICD-10-CM

## 2018-12-26 DIAGNOSIS — M50.30 DDD (DEGENERATIVE DISC DISEASE), CERVICAL: ICD-10-CM

## 2019-01-11 ENCOUNTER — HOSPITAL ENCOUNTER (OUTPATIENT)
Dept: MRI IMAGING | Facility: HOSPITAL | Age: 54
End: 2019-01-11
Attending: FAMILY MEDICINE

## 2019-01-22 ENCOUNTER — HOSPITAL ENCOUNTER (OUTPATIENT)
Dept: MRI IMAGING | Facility: HOSPITAL | Age: 54
Discharge: HOME | End: 2019-01-22
Attending: FAMILY MEDICINE | Admitting: FAMILY MEDICINE

## 2019-01-22 PROCEDURE — 72141 MRI NECK SPINE W/O DYE: CPT

## 2019-01-23 DIAGNOSIS — M79.602 CHRONIC PAIN OF LEFT UPPER EXTREMITY: ICD-10-CM

## 2019-01-23 DIAGNOSIS — G89.29 CHRONIC PAIN OF LEFT UPPER EXTREMITY: ICD-10-CM

## 2019-01-23 DIAGNOSIS — M48.02 CERVICAL SPINAL STENOSIS: ICD-10-CM

## 2019-01-23 DIAGNOSIS — M54.12 CERVICAL RADICULOPATHY: Primary | ICD-10-CM

## 2019-02-04 RX ORDER — FLUTICASONE PROPIONATE 50 MCG
SPRAY, SUSPENSION (ML) NASAL
Qty: 1 BOTTLE | Refills: 4 | Status: SHIPPED | OUTPATIENT
Start: 2019-02-04 | End: 2019-11-27 | Stop reason: SDUPTHER

## 2019-02-12 ENCOUNTER — OFFICE VISIT (OUTPATIENT)
Dept: NEUROSURGERY | Facility: CLINIC | Age: 54
End: 2019-02-12

## 2019-02-12 VITALS — BODY MASS INDEX: 32.07 KG/M2 | WEIGHT: 224 LBS | HEIGHT: 70 IN

## 2019-02-12 DIAGNOSIS — M50.20 HERNIATION OF CERVICAL INTERVERTEBRAL DISC: Primary | ICD-10-CM

## 2019-02-12 PROCEDURE — 99243 OFF/OP CNSLTJ NEW/EST LOW 30: CPT | Performed by: NEUROLOGICAL SURGERY

## 2019-02-12 NOTE — PROGRESS NOTES
Subjective   Patient ID: Tom Harvey is a 54 y.o. male is being seen for consultation today at the request of No ref. 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  He has a history of pain in his neck and left arm that occurred for about 3 months year and a half ago then cleared up, and about 2 months ago pain recurred again in the left neck and shoulder radiating to the left arm.  This pain has again improved to where it is only a minor discomfort now.  He found that if he applied traction to his neck he could get temporary relief.  Turning his head would increase the pain.    Review of Radiographic Studies:  Cervical MRI scan done on 1/22/2019 shows a chronic degenerative disc at C6-7 with a left foraminal stenosis, consistent with his symptoms and a left C7 radiculopathy.    The following portions of the patient's history were reviewed, updated as appropriate and approved: allergies, current medications, past family history, past medical history, past social history, past surgical history, review of systems and problem list.  Review of Systems   Constitutional: Negative for activity change, appetite change, chills, diaphoresis, fatigue, fever and unexpected weight change.   HENT: Negative for congestion, dental problem, drooling, ear discharge, ear pain, facial swelling, hearing loss, mouth sores, nosebleeds, postnasal drip, rhinorrhea, sinus pressure, sneezing, sore throat, tinnitus, trouble swallowing and voice change.    Eyes: Negative for photophobia, pain, discharge, redness, itching and visual disturbance.   Respiratory: Negative for apnea, cough, choking, chest tightness, shortness of breath, wheezing and stridor.    Cardiovascular: Negative for chest pain, palpitations and leg swelling.   Gastrointestinal: Negative for abdominal  distention, abdominal pain, anal bleeding, blood in stool, constipation, diarrhea, nausea, rectal pain and vomiting.   Endocrine: Negative for cold intolerance, heat intolerance, polydipsia, polyphagia and polyuria.   Genitourinary: Negative for decreased urine volume, difficulty urinating, dysuria, enuresis, flank pain, frequency, genital sores, hematuria and urgency.   Musculoskeletal: Positive for arthralgias, back pain, myalgias, neck pain and neck stiffness. Negative for gait problem and joint swelling.   Skin: Negative for color change, pallor, rash and wound.   Allergic/Immunologic: Negative for environmental allergies, food allergies and immunocompromised state.   Neurological: Negative for dizziness, tremors, seizures, syncope, facial asymmetry, speech difficulty, weakness, light-headedness, numbness and headaches.   Hematological: Negative for adenopathy. Does not bruise/bleed easily.   Psychiatric/Behavioral: Negative for agitation, behavioral problems, confusion, decreased concentration, dysphoric mood, hallucinations, self-injury, sleep disturbance and suicidal ideas. The patient is not nervous/anxious and is not hyperactive.        Objective     NEUROLOGICAL EXAMINATION:      MENTAL STATUS:  Alert and oriented.  Speech intact.  Recent and remote memory intact.      CRANIAL NERVES:  Cranial nerve II:  Visual fields are full.  Cranial nerves III, IV and VI:  PERRLADC.  Extraocular movements are intact.  Nystagmus is not present.  Cranial nerve V:  Facial sensation is intact.  Cranial nerve VII:  Muscles of facial expression reveal no asymmetry.  Cranial nerve VIII:  Hearing is intact.  Cranial nerves IX and X:  Palate elevates symmetrically.  Cranial nerve XI:  Shoulder shrug is intact.  Cranial nerve XII:  Tongue is midline without evidence of atrophy or fasciculation.    MUSCULOSKELETAL: Cervical range of motion intact.    MOTOR: No weakness of deltoid, biceps, triceps, or  on the  left.    SENSATION: No sensory loss in the left arm or hand.    REFLEXES:  DTR trace in both biceps and triceps.    Assessment   Chronic degenerative disc C6-7 with left foraminal stenosis and recent left C7 radiculopathy, now symptomatically improved spontaneously.       Plan   No additional treatment needed at this point.  If symptoms recur he could treated with ibuprofen and a home cervical traction unit that could be obtained from the pharmacy.  If symptoms persisted to a severe level of intensity, this could be treated with ACDF at C6-7.       Espinoza Ziegler MD

## 2019-03-06 RX ORDER — LISINOPRIL 20 MG/1
TABLET ORAL
Qty: 30 TABLET | Refills: 4 | Status: SHIPPED | OUTPATIENT
Start: 2019-03-06 | End: 2019-08-01 | Stop reason: SDUPTHER

## 2019-03-12 ENCOUNTER — CLINICAL SUPPORT NO REQUIREMENTS (OUTPATIENT)
Dept: CARDIOLOGY | Facility: CLINIC | Age: 54
End: 2019-03-12

## 2019-03-12 DIAGNOSIS — R00.1 BRADYCARDIA BY ELECTROCARDIOGRAM: ICD-10-CM

## 2019-03-12 PROCEDURE — 93294 REM INTERROG EVL PM/LDLS PM: CPT | Performed by: INTERNAL MEDICINE

## 2019-03-12 PROCEDURE — 93296 REM INTERROG EVL PM/IDS: CPT | Performed by: INTERNAL MEDICINE

## 2019-04-26 DIAGNOSIS — F51.09 SITUATIONAL INSOMNIA: ICD-10-CM

## 2019-04-29 RX ORDER — ZOLPIDEM TARTRATE 10 MG/1
TABLET ORAL
Qty: 30 TABLET | Refills: 1 | Status: SHIPPED | OUTPATIENT
Start: 2019-04-29 | End: 2019-09-23 | Stop reason: SDUPTHER

## 2019-06-10 RX ORDER — CITALOPRAM 10 MG/1
TABLET ORAL
Qty: 90 TABLET | Refills: 0 | Status: SHIPPED | OUTPATIENT
Start: 2019-06-10 | End: 2019-09-06 | Stop reason: SDUPTHER

## 2019-06-18 ENCOUNTER — CLINICAL SUPPORT NO REQUIREMENTS (OUTPATIENT)
Dept: CARDIOLOGY | Facility: CLINIC | Age: 54
End: 2019-06-18

## 2019-06-18 DIAGNOSIS — R00.1 SINUS BRADYCARDIA: Primary | ICD-10-CM

## 2019-06-18 PROCEDURE — 93296 REM INTERROG EVL PM/IDS: CPT | Performed by: INTERNAL MEDICINE

## 2019-06-18 PROCEDURE — 93294 REM INTERROG EVL PM/LDLS PM: CPT | Performed by: INTERNAL MEDICINE

## 2019-07-30 ENCOUNTER — OFFICE VISIT (OUTPATIENT)
Dept: CARDIOLOGY | Facility: CLINIC | Age: 54
End: 2019-07-30

## 2019-07-30 VITALS
SYSTOLIC BLOOD PRESSURE: 130 MMHG | DIASTOLIC BLOOD PRESSURE: 80 MMHG | OXYGEN SATURATION: 96 % | BODY MASS INDEX: 30.95 KG/M2 | HEART RATE: 85 BPM | HEIGHT: 70 IN | WEIGHT: 216.2 LBS

## 2019-07-30 DIAGNOSIS — I10 ESSENTIAL HYPERTENSION: ICD-10-CM

## 2019-07-30 DIAGNOSIS — R00.1 BRADYCARDIA BY ELECTROCARDIOGRAM: ICD-10-CM

## 2019-07-30 DIAGNOSIS — R55 SYNCOPE AND COLLAPSE: Primary | ICD-10-CM

## 2019-07-30 PROCEDURE — 99213 OFFICE O/P EST LOW 20 MIN: CPT | Performed by: INTERNAL MEDICINE

## 2019-07-30 PROCEDURE — 93280 PM DEVICE PROGR EVAL DUAL: CPT | Performed by: INTERNAL MEDICINE

## 2019-07-30 NOTE — PROGRESS NOTES
Tom Harvey  1965  PCP: Meghan Burrell MD    SUBJECTIVE:   Tom Harvey is a 54 y.o. male seen for a follow up visit regarding the following:     Chief Complaint: Follow up for Syncope/Bradycardia    HPI:    Since last visit the patient's status has been stable. No recurrent syncopal events.     History:       Cardiac PMH: (Old records have been reviewed and summarized below)  1. 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 syncope in setting of #1  3. hypertension  4. Fatigue, chronic    Current Outpatient Medications:   •  citalopram (CeleXA) 10 MG tablet, TAKE ONE TABLET BY MOUTH DAILY, Disp: 90 tablet, Rfl: 0  •  fluticasone (FLONASE) 50 MCG/ACT nasal spray, SPRAY TWO SPRAYS IN EACH NOSTRIL ONCE DAILY, Disp: 1 bottle, Rfl: 4  •  lisinopril (PRINIVIL,ZESTRIL) 20 MG tablet, TAKE ONE TABLET BY MOUTH DAILY, Disp: 30 tablet, Rfl: 4  •  zolpidem (AMBIEN) 10 MG tablet, TAKE ONE-HALF TO ONE TABLET BY MOUTH EVERY NIGHT AT BEDTIME AS NEEDED FOR SLEEP, Disp: 30 tablet, Rfl: 1    Past Medical History, Past Surgical History, Family history, Social History, and Medications were all reviewed with the patient today and updated as necessary.       Patient Active Problem List   Diagnosis   • Bradycardia by electrocardiogram   • Syncope and collapse   • S/P placement of cardiac pacemaker   • Pacemaker   • Class 1 obesity due to excess calories with serious comorbidity and body mass index (BMI) of 32.0 to 32.9 in adult   • Polyarthralgia   • Situational insomnia   • Irritable bowel syndrome with diarrhea   • Essential hypertension   • Dyslipidemia   • Hypogonadism in male   • Vitamin B 12 deficiency   • Encounter for screening colonoscopy   • DDD (degenerative disc disease), cervical   • Osteoarthritis of spine with radiculopathy, cervical region     Allergies   Allergen Reactions   • Levaquin [Levofloxacin] Hives     Past Medical History:   Diagnosis Date   • Asystole by  "electrocardiogram (CMS/[facility]) 08/19/1999    PAC   • Hypertension    • Pacemaker    • Syncope and collapse 3/11/2017   • Wears glasses      Past Surgical History:   Procedure Laterality Date   • CARDIAC ELECTROPHYSIOLOGY PROCEDURE N/A 3/13/2017    Procedure: Pacemaker DC new;  Surgeon: Be Barron MD;  Location:  [facility];  Service:    • COLONOSCOPY N/A 7/6/2018    Procedure: COLONOSCOPY;  Surgeon: Hong Silverio MD;  [facility]: [facility] ENDOSCOPY;  Service: Gastroenterology   • KNEE ARTHROPLASTY UNICOMPARTMENTAL BILATERAL     • PACEMAKER IMPLANTATION       Family History   Problem Relation Age of Onset   • Stroke Mother 43   • Heart disease Father    • Dysphagia Father      Social History     Tobacco Use   • Smoking status: Never Smoker   • Smokeless tobacco: Never Used   Substance Use Topics   • Alcohol use: No         PHYSICAL EXAM:    /80 (BP Location: Left arm, Patient Position: Sitting)   Pulse 85   Ht 177.8 cm (70\")   Wt 98.1 kg (216 lb 3.2 oz)   SpO2 96%   BMI 31.02 kg/m²        Wt Readings from Last 5 Encounters:   07/30/19 98.1 kg (216 lb 3.2 oz)   02/12/19 102 kg (224 lb)   12/14/18 102 kg (224 lb)   07/24/18 98.4 kg (217 lb)   07/02/18 98.9 kg (218 lb)       BP Readings from Last 5 Encounters:   07/30/19 130/80   12/14/18 128/80   07/24/18 130/82   07/06/18 106/70   05/15/18 122/84       General-Well Nourished, Well developed  Eyes - PERRLA  Neck- supple, No mass  CV- regular rate and rhythm, no MRG, No edema  Lung- clear bilaterally  Abd- soft, +BS  Musc/skel - Norm strength and range of motion  Skin- warm and dry  Neuro - Alert & Oriented x 3, appropriate mood.        Medical problems and test results were reviewed with the patient today.     No results found for this or any previous visit (from the past 672 hour(s)).      EKG: (EKG has been independently visualized by me and summarized below)    Procedures     ASSESSMENT and PLAN  1. Sinus arrest s/p implantation of " BTK DDD PPM  2. Syncope- no recurrence  3. HTN- stable  4. Pacemaker - Stable function        Return in about 1 year (around 7/30/2020) for office visit and device check with [employer].        Benjamín Chaparro M.D., FPRATEEKC, F.H.R.S.  Cardiology/Electrophysiology  7/30/2019  3:32 PM

## 2019-08-01 RX ORDER — LISINOPRIL 20 MG/1
TABLET ORAL
Qty: 30 TABLET | Refills: 1 | Status: SHIPPED | OUTPATIENT
Start: 2019-08-01 | End: 2019-09-26 | Stop reason: SDUPTHER

## 2019-09-06 RX ORDER — CITALOPRAM 10 MG/1
TABLET ORAL
Qty: 90 TABLET | Refills: 0 | Status: SHIPPED | OUTPATIENT
Start: 2019-09-06 | End: 2019-12-01 | Stop reason: SDUPTHER

## 2019-09-17 ENCOUNTER — CLINICAL SUPPORT NO REQUIREMENTS (OUTPATIENT)
Dept: CARDIOLOGY | Facility: CLINIC | Age: 54
End: 2019-09-17

## 2019-09-17 DIAGNOSIS — R00.1 BRADYCARDIA BY ELECTROCARDIOGRAM: ICD-10-CM

## 2019-09-17 PROCEDURE — 93294 REM INTERROG EVL PM/LDLS PM: CPT | Performed by: INTERNAL MEDICINE

## 2019-09-17 PROCEDURE — 93296 REM INTERROG EVL PM/IDS: CPT | Performed by: INTERNAL MEDICINE

## 2019-09-23 DIAGNOSIS — F51.09 SITUATIONAL INSOMNIA: ICD-10-CM

## 2019-09-24 RX ORDER — ZOLPIDEM TARTRATE 10 MG/1
TABLET ORAL
Qty: 30 TABLET | Refills: 2 | Status: SHIPPED | OUTPATIENT
Start: 2019-09-24 | End: 2020-03-09

## 2019-09-26 RX ORDER — LISINOPRIL 20 MG/1
TABLET ORAL
Qty: 30 TABLET | Refills: 5 | Status: SHIPPED | OUTPATIENT
Start: 2019-09-26 | End: 2020-03-23

## 2019-10-02 ENCOUNTER — OFFICE VISIT (OUTPATIENT)
Dept: FAMILY MEDICINE CLINIC | Facility: CLINIC | Age: 54
End: 2019-10-02

## 2019-10-02 VITALS
BODY MASS INDEX: 31.8 KG/M2 | HEART RATE: 80 BPM | HEIGHT: 70 IN | TEMPERATURE: 97.9 F | WEIGHT: 222.13 LBS | SYSTOLIC BLOOD PRESSURE: 130 MMHG | OXYGEN SATURATION: 97 % | DIASTOLIC BLOOD PRESSURE: 80 MMHG

## 2019-10-02 DIAGNOSIS — F51.09 SITUATIONAL INSOMNIA: ICD-10-CM

## 2019-10-02 DIAGNOSIS — I10 ESSENTIAL HYPERTENSION: Primary | ICD-10-CM

## 2019-10-02 DIAGNOSIS — E29.1 HYPOGONADISM IN MALE: ICD-10-CM

## 2019-10-02 DIAGNOSIS — E66.09 CLASS 1 OBESITY DUE TO EXCESS CALORIES WITH SERIOUS COMORBIDITY AND BODY MASS INDEX (BMI) OF 31.0 TO 31.9 IN ADULT: ICD-10-CM

## 2019-10-02 DIAGNOSIS — E78.5 DYSLIPIDEMIA: ICD-10-CM

## 2019-10-02 DIAGNOSIS — R73.01 IFG (IMPAIRED FASTING GLUCOSE): ICD-10-CM

## 2019-10-02 LAB — HBA1C MFR BLD: 5.5 %

## 2019-10-02 PROCEDURE — 83036 HEMOGLOBIN GLYCOSYLATED A1C: CPT | Performed by: FAMILY MEDICINE

## 2019-10-02 PROCEDURE — 99214 OFFICE O/P EST MOD 30 MIN: CPT | Performed by: FAMILY MEDICINE

## 2019-10-02 NOTE — PROGRESS NOTES
Subjective   Tom Harvey is a 54 y.o. male.     History of Present Illness  Mr. Harvey presents today for routine follow-up on several chronic medical problems.    Since his last appointment he has had consultation with neurosurgery for evaluation of cervical disc disease with C7 radiculopathy.  No surgical intervention recommended at that time.  Fortunately he has had spontaneous improvement in his symptoms.  No new focal neurological symptoms.    He has essential hypertension for which she is currently on lisinopril.  Taking as prescribed.  Denies side effects.  Blood pressure has been fairly well controlled.  He is followed by cardiology as he is status post pacemaker placement for previous sinus arrest.  Last follow-up July 30, 2019.  No changes made to daily regimen.    He has situational insomnia for which he takes Ambien as needed.  Tolerating well.  No parasomnia    He has chronic obesity in association with dyslipidemia and impaired fasting glucose.  Not currently following heart healthy/diabetic appropriate diet.  Getting irregular exercise.  Weight up from last visit.  He does have fatigue, but denies polydipsia, polyuria, dry mouth.    He has previous diagnosis of hypogonadism but has not pursue testosterone replacement.    The following portions of the patient's history were reviewed and updated as appropriate: allergies, current medications, past family history, past medical history, past social history, past surgical history and problem list.    Review of Systems   Constitutional: Positive for fatigue and unexpected weight change. Negative for fever.   HENT: Negative for congestion, nosebleeds, rhinorrhea, sneezing, sore throat and trouble swallowing.    Eyes: Negative for visual disturbance.   Respiratory: Negative for cough, shortness of breath and wheezing.    Cardiovascular: Negative for chest pain, palpitations and leg swelling.   Gastrointestinal: Negative for abdominal pain, blood in  stool, constipation, diarrhea, nausea and vomiting.   Endocrine: Negative for cold intolerance, heat intolerance, polydipsia and polyuria.   Genitourinary: Negative for dysuria and hematuria.   Musculoskeletal: Positive for arthralgias and back pain. Negative for myalgias and neck pain.   Skin: Negative for rash and wound.   Neurological: Negative for dizziness, tremors, syncope, speech difficulty, weakness, numbness and headaches.   Hematological: Negative for adenopathy. Does not bruise/bleed easily.   Psychiatric/Behavioral: Negative for confusion, dysphoric mood, sleep disturbance and suicidal ideas. The patient is not nervous/anxious.    Pt's previous ROS reviewed and updated as indicated.       Objective    Vitals:    10/02/19 1457   BP: 130/80   Pulse: 80   Temp: 97.9 °F (36.6 °C)   SpO2: 97%     Body mass index is 31.87 kg/m².      10/02/19  1457   Weight: 101 kg (222 lb 2 oz)       Physical Exam   Constitutional: He is oriented to person, place, and time. He appears well-developed and well-nourished. He is cooperative. He does not appear ill. No distress.   obese   HENT:   Head: Normocephalic and atraumatic.   Mouth/Throat: Mucous membranes are normal. Mucous membranes are not dry.   Eyes: Conjunctivae, EOM and lids are normal.   Neck: Phonation normal. Neck supple. Normal carotid pulses present. Carotid bruit is not present. No thyromegaly present.   Cardiovascular: Normal rate, regular rhythm, normal heart sounds and intact distal pulses.   Pulmonary/Chest: Effort normal and breath sounds normal.     Vascular Status -  His right foot exhibits no edema. His left foot exhibits no edema.  Neurological: He is alert and oriented to person, place, and time. He displays no tremor. Gait normal.   Skin: Skin is warm and dry. No bruising and no rash noted.   Psychiatric: He has a normal mood and affect. His behavior is normal. Cognition and memory are normal.   Nursing note and vitals reviewed.    Lab Results    Component Value Date    HGBA1C 5.5 10/02/2019    HGBA1C 5.80 12/14/2018    HGBA1C 5.70 05/15/2018     Lab Results   Component Value Date    CREATININE 1.00 05/15/2018     Lab Results   Component Value Date    WBC 7.76 12/14/2018    HGB 15.6 12/14/2018    HCT 46.9 12/14/2018    MCV 91.8 12/14/2018     12/14/2018       Lab Results   Component Value Date    GLUCOSE 76 02/18/2018    BUN 21 (H) 05/15/2018    CREATININE 1.00 05/15/2018    EGFRIFNONA 78 05/15/2018    EGFRIFAFRI 95 05/15/2018    BCR 21.0 05/15/2018    K 4.3 05/15/2018    CO2 30.0 05/15/2018    CALCIUM 9.5 05/15/2018    PROTENTOTREF 7.1 05/15/2018    ALBUMIN 4.30 05/15/2018    LABIL2 1.5 05/15/2018    AST 30 05/15/2018    ALT 41 05/15/2018       Lab Results   Component Value Date    CHLPL 205 (H) 05/15/2018    TRIG 129 05/15/2018    HDL 34 (L) 05/15/2018     (H) 05/15/2018       Lab Results   Component Value Date    TSH 2.33 05/15/2018     Lab Results   Component Value Date    TESTOSTERONE 340 (L) 06/30/2014     Assessment/Plan   Tom was seen today for follow-up.    Diagnoses and all orders for this visit:    Essential hypertension    Situational insomnia    Dyslipidemia    IFG (impaired fasting glucose)  -     POC Glycosylated Hemoglobin (Hb A1C)    Hypogonadism in male    Class 1 obesity due to excess calories with serious comorbidity and body mass index (BMI) of 31.0 to 31.9 in adult       Essential hypertension controlled on lisinopril.  Associated dyslipidemia for which he has declined statin.  Encouraged heart healthy eating, regular aerobic exercise.    Situational insomnia stable on Ambien.  No adverse effects of medication at this time.    Chronic obesity in association with dyslipidemia as above, impaired fasting glucose/prediabetes.  A1c improved today.  Diabetes prevention measures once again reviewed.    Hypogonadism associated with fatigue.  We have discussed diagnostic/treatment options.  He voices understanding that  testosterone levels should naturally increase with weight loss.  He wishes to pursue lifestyle modifications at this time.    He plans to get influenza vaccine at his workplace.    Routine follow-up in 6 months, sooner as needed.  Patient was encouraged to keep me informed of any acute changes, lack of improvement, or any new concerning symptoms.  Pt is aware of reasons to seek emergent care.  Patient voiced understanding of all instructions and denied further questions.              Please note that portions of this note may have been completed with a voice recognition program. Efforts were made to edit the dictations, but occasionally words are mistranscribed.

## 2019-11-27 ENCOUNTER — TELEPHONE (OUTPATIENT)
Dept: FAMILY MEDICINE CLINIC | Facility: CLINIC | Age: 54
End: 2019-11-27

## 2019-11-27 RX ORDER — FLUTICASONE PROPIONATE 50 MCG
2 SPRAY, SUSPENSION (ML) NASAL DAILY
Qty: 1 BOTTLE | Refills: 4 | Status: SHIPPED | OUTPATIENT
Start: 2019-11-27 | End: 2020-08-06

## 2019-12-02 RX ORDER — CITALOPRAM 10 MG/1
TABLET ORAL
Qty: 90 TABLET | Refills: 0 | Status: SHIPPED | OUTPATIENT
Start: 2019-12-02 | End: 2020-02-27

## 2020-01-21 ENCOUNTER — CLINICAL SUPPORT NO REQUIREMENTS (OUTPATIENT)
Dept: CARDIOLOGY | Facility: CLINIC | Age: 55
End: 2020-01-21

## 2020-01-21 DIAGNOSIS — R00.1 BRADYCARDIA BY ELECTROCARDIOGRAM: ICD-10-CM

## 2020-01-21 PROCEDURE — 93296 REM INTERROG EVL PM/IDS: CPT | Performed by: INTERNAL MEDICINE

## 2020-01-21 PROCEDURE — 93294 REM INTERROG EVL PM/LDLS PM: CPT | Performed by: INTERNAL MEDICINE

## 2020-02-27 RX ORDER — CITALOPRAM 10 MG/1
TABLET ORAL
Qty: 90 TABLET | Refills: 0 | Status: SHIPPED | OUTPATIENT
Start: 2020-02-27 | End: 2020-05-26

## 2020-03-07 DIAGNOSIS — F51.09 SITUATIONAL INSOMNIA: ICD-10-CM

## 2020-03-09 RX ORDER — ZOLPIDEM TARTRATE 10 MG/1
TABLET ORAL
Qty: 30 TABLET | Refills: 2 | Status: SHIPPED | OUTPATIENT
Start: 2020-03-09 | End: 2020-08-19

## 2020-03-23 RX ORDER — LISINOPRIL 20 MG/1
TABLET ORAL
Qty: 30 TABLET | Refills: 5 | Status: SHIPPED | OUTPATIENT
Start: 2020-03-23 | End: 2020-09-28

## 2020-05-26 RX ORDER — CITALOPRAM 10 MG/1
TABLET ORAL
Qty: 90 TABLET | Refills: 0 | Status: SHIPPED | OUTPATIENT
Start: 2020-05-26 | End: 2020-08-21

## 2020-08-04 ENCOUNTER — OFFICE VISIT (OUTPATIENT)
Dept: CARDIOLOGY | Facility: CLINIC | Age: 55
End: 2020-08-04

## 2020-08-04 VITALS
HEIGHT: 70 IN | SYSTOLIC BLOOD PRESSURE: 106 MMHG | WEIGHT: 217 LBS | TEMPERATURE: 97.7 F | HEART RATE: 80 BPM | OXYGEN SATURATION: 96 % | BODY MASS INDEX: 31.07 KG/M2 | DIASTOLIC BLOOD PRESSURE: 68 MMHG

## 2020-08-04 DIAGNOSIS — I49.5 SINUS NODE DYSFUNCTION (HCC): ICD-10-CM

## 2020-08-04 DIAGNOSIS — Z95.0 PACEMAKER: ICD-10-CM

## 2020-08-04 DIAGNOSIS — R55 SYNCOPE AND COLLAPSE: Primary | ICD-10-CM

## 2020-08-04 PROCEDURE — 99213 OFFICE O/P EST LOW 20 MIN: CPT | Performed by: INTERNAL MEDICINE

## 2020-08-04 PROCEDURE — 93280 PM DEVICE PROGR EVAL DUAL: CPT | Performed by: INTERNAL MEDICINE

## 2020-08-04 NOTE — PROGRESS NOTES
Tom Harvey  1965  PCP: Meghan Burrell MD    SUBJECTIVE:   Tom Harvey is a 55 y.o. male seen for a follow up visit regarding the following:     Chief Complaint: Follow up for Syncope/Bradycardia    HPI:    Since last visit the patient's status has been stable. No recurrent syncopal events. Remains very active.     History:       Cardiac PMH: (Old records have been reviewed and summarized below)  1. 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 syncope in setting of #1  3. hypertension  4. Fatigue, chronic    Current Outpatient Medications:   •  citalopram (CeleXA) 10 MG tablet, TAKE ONE TABLET BY MOUTH DAILY, Disp: 90 tablet, Rfl: 0  •  fluticasone (FLONASE) 50 MCG/ACT nasal spray, 2 sprays by Each Nare route Daily., Disp: 1 bottle, Rfl: 4  •  lisinopril (PRINIVIL,ZESTRIL) 20 MG tablet, TAKE ONE TABLET BY MOUTH DAILY, Disp: 30 tablet, Rfl: 5  •  zolpidem (AMBIEN) 10 MG tablet, TAKE ONE HALF TO ONE TABLET BY MOUTH EVERY NIGHT AT BEDTIME AS NEEDED FOR SLEEP, Disp: 30 tablet, Rfl: 2    Past Medical History, Past Surgical History, Family history, Social History, and Medications were all reviewed with the patient today and updated as necessary.       Patient Active Problem List   Diagnosis   • Bradycardia by electrocardiogram   • Syncope and collapse   • S/P placement of cardiac pacemaker   • Pacemaker   • Class 1 obesity due to excess calories with serious comorbidity and body mass index (BMI) of 31.0 to 31.9 in adult   • Polyarthralgia   • Situational insomnia   • Irritable bowel syndrome with diarrhea   • Essential hypertension   • Dyslipidemia   • Hypogonadism in male   • Vitamin B 12 deficiency   • Encounter for screening colonoscopy   • DDD (degenerative disc disease), cervical   • Osteoarthritis of spine with radiculopathy, cervical region   • Sinus node dysfunction (CMS/HCC)     Allergies   Allergen Reactions   • Levaquin [Levofloxacin] Hives     Past Medical  "History:   Diagnosis Date   • Asystole by electrocardiogram (CMS/[facility]) 08/19/1999    PAC   • Hypertension    • Pacemaker    • Syncope and collapse 3/11/2017   • Wears glasses      Past Surgical History:   Procedure Laterality Date   • CARDIAC ELECTROPHYSIOLOGY PROCEDURE N/A 3/13/2017    Procedure: Pacemaker DC new;  Surgeon: Be Barron MD;  Location:  [facility];  Service:    • COLONOSCOPY N/A 7/6/2018    Procedure: COLONOSCOPY;  Surgeon: Hong Silverio MD;  Location:  [facility];  Service: Gastroenterology   • KNEE ARTHROPLASTY UNICOMPARTMENTAL BILATERAL     • PACEMAKER IMPLANTATION       Family History   Problem Relation Age of Onset   • Stroke Mother 43   • Heart disease Father    • Dysphagia Father      Social History     Tobacco Use   • Smoking status: Never Smoker   • Smokeless tobacco: Never Used   Substance Use Topics   • Alcohol use: No         PHYSICAL EXAM:    /68 (BP Location: Left arm, Patient Position: Sitting, Cuff Size: Adult)   Pulse 80   Temp 97.7 °F (36.5 °C)   Ht 177.8 cm (70\")   Wt 98.4 kg (217 lb)   SpO2 96%   BMI 31.14 kg/m²        Wt Readings from Last 5 Encounters:   08/04/20 98.4 kg (217 lb)   10/02/19 101 kg (222 lb 2 oz)   07/30/19 98.1 kg (216 lb 3.2 oz)   02/12/19 102 kg (224 lb)   12/14/18 102 kg (224 lb)       BP Readings from Last 5 Encounters:   08/04/20 106/68   10/02/19 130/80   07/30/19 130/80   12/14/18 128/80   07/24/18 130/82       General-Well Nourished, Well developed  Eyes - PERRLA  Neck- supple, No mass  CV- regular rate and rhythm, no MRG, No edema  Lung- clear bilaterally  Abd- soft, +BS  Musc/skel - Norm strength and range of motion  Skin- warm and dry  Neuro - Alert & Oriented x 3, appropriate mood.        Medical problems and test results were reviewed with the patient today.     No results found for this or any previous visit (from the past 672 hour(s)).      EKG: (EKG has been independently visualized by me and summarized " below)    Procedures     ASSESSMENT and PLAN  1. Sinus arrest s/p implantation of BTK DDD PPM  2. Syncope- no recurrence  3. HTN- stable  4. Pacemaker - Stable function  5. Family History of pulmonary disease - Discuss with patient that if SOB/KRUEGER develop we will also do a cardiac work up.         Return in about 1 year (around 8/4/2021) for office visit and device check with [employer].        Benjamín Chaparro M.D., F.A.C.C, F.H.R.S.  Cardiology/Electrophysiology  8/4/2020  15:25

## 2020-08-06 RX ORDER — FLUTICASONE PROPIONATE 50 MCG
SPRAY, SUSPENSION (ML) NASAL
Qty: 1 BOTTLE | Refills: 3 | Status: SHIPPED | OUTPATIENT
Start: 2020-08-06 | End: 2021-01-25

## 2020-08-18 DIAGNOSIS — F51.09 SITUATIONAL INSOMNIA: ICD-10-CM

## 2020-08-19 RX ORDER — ZOLPIDEM TARTRATE 10 MG/1
TABLET ORAL
Qty: 30 TABLET | Refills: 1 | Status: SHIPPED | OUTPATIENT
Start: 2020-08-19 | End: 2020-12-11

## 2020-08-21 RX ORDER — CITALOPRAM 10 MG/1
10 TABLET ORAL DAILY
Qty: 90 TABLET | Refills: 0 | Status: SHIPPED | OUTPATIENT
Start: 2020-08-21 | End: 2020-10-22 | Stop reason: SDUPTHER

## 2020-09-21 RX ORDER — LISINOPRIL 20 MG/1
TABLET ORAL
Qty: 30 TABLET | Refills: 4 | OUTPATIENT
Start: 2020-09-21

## 2020-09-28 RX ORDER — LISINOPRIL 20 MG/1
TABLET ORAL
Qty: 30 TABLET | Refills: 0 | Status: SHIPPED | OUTPATIENT
Start: 2020-09-28 | End: 2020-10-22 | Stop reason: SDUPTHER

## 2020-10-14 RX ORDER — LISINOPRIL 20 MG/1
TABLET ORAL
Qty: 30 TABLET | Refills: 0 | OUTPATIENT
Start: 2020-10-14

## 2020-10-15 RX ORDER — LISINOPRIL 20 MG/1
TABLET ORAL
Qty: 30 TABLET | Refills: 0 | OUTPATIENT
Start: 2020-10-15

## 2020-10-20 ENCOUNTER — TELEPHONE (OUTPATIENT)
Dept: FAMILY MEDICINE CLINIC | Facility: CLINIC | Age: 55
End: 2020-10-20

## 2020-10-20 DIAGNOSIS — F51.09 SITUATIONAL INSOMNIA: ICD-10-CM

## 2020-10-20 RX ORDER — ZOLPIDEM TARTRATE 10 MG/1
TABLET ORAL
Qty: 30 TABLET | Refills: 1 | Status: CANCELLED | OUTPATIENT
Start: 2020-10-20

## 2020-10-20 RX ORDER — LISINOPRIL 20 MG/1
20 TABLET ORAL DAILY
Qty: 30 TABLET | Refills: 0 | Status: CANCELLED | OUTPATIENT
Start: 2020-10-20

## 2020-10-20 RX ORDER — CITALOPRAM 10 MG/1
10 TABLET ORAL DAILY
Qty: 90 TABLET | Refills: 0 | Status: CANCELLED | OUTPATIENT
Start: 2020-10-20

## 2020-10-20 NOTE — TELEPHONE ENCOUNTER
Caller: Tom Harvey    Relationship: Self    Best call back number: [phone]    Medication needed:   Requested Prescriptions     Pending Prescriptions Disp Refills   • lisinopril (PRINIVIL,ZESTRIL) 20 MG tablet 30 tablet 0     Sig: Take 1 tablet by mouth Daily.   • citalopram (CeleXA) 10 MG tablet 90 tablet 0     Sig: Take 1 tablet by mouth Daily. NEEDS APPOINTMENT FOR ADDITIONAL REFILLS   • zolpidem (AMBIEN) 10 MG tablet 30 tablet 1     Sig: TAKE 1/2 TO  1 TABLET TAKE TABLET BY MOUTH ONCE NIGHTLY AS NEEDED FOR SLEEP     What details did the patient provide when requesting the medication: PATIENT OUT OF LISINOPRIL    Does the patient have less than a 3 day supply:  [x] Yes  [] No    What is the patient's preferred pharmacy: [facility] [address] [place] [address] [facility] - [phone] [facility] [phone] [facility]

## 2020-10-22 ENCOUNTER — OFFICE VISIT (OUTPATIENT)
Dept: FAMILY MEDICINE CLINIC | Facility: CLINIC | Age: 55
End: 2020-10-22

## 2020-10-22 DIAGNOSIS — I10 ESSENTIAL HYPERTENSION: Primary | ICD-10-CM

## 2020-10-22 DIAGNOSIS — I49.5 SINUS NODE DYSFUNCTION (HCC): ICD-10-CM

## 2020-10-22 DIAGNOSIS — F41.1 GAD (GENERALIZED ANXIETY DISORDER): ICD-10-CM

## 2020-10-22 DIAGNOSIS — Z95.0 PACEMAKER: ICD-10-CM

## 2020-10-22 DIAGNOSIS — E78.5 DYSLIPIDEMIA: ICD-10-CM

## 2020-10-22 DIAGNOSIS — F51.09 SITUATIONAL INSOMNIA: ICD-10-CM

## 2020-10-22 PROBLEM — M47.22 OSTEOARTHRITIS OF SPINE WITH RADICULOPATHY, CERVICAL REGION: Status: RESOLVED | Noted: 2018-12-21 | Resolved: 2020-10-22

## 2020-10-22 PROBLEM — E29.1 HYPOGONADISM IN MALE: Status: RESOLVED | Noted: 2018-06-17 | Resolved: 2020-10-22

## 2020-10-22 PROBLEM — R55 SYNCOPE AND COLLAPSE: Status: RESOLVED | Noted: 2017-03-11 | Resolved: 2020-10-22

## 2020-10-22 PROBLEM — M25.50 POLYARTHRALGIA: Status: RESOLVED | Noted: 2018-05-15 | Resolved: 2020-10-22

## 2020-10-22 PROBLEM — E53.8 VITAMIN B 12 DEFICIENCY: Status: RESOLVED | Noted: 2018-06-17 | Resolved: 2020-10-22

## 2020-10-22 PROBLEM — K58.0 IRRITABLE BOWEL SYNDROME WITH DIARRHEA: Status: RESOLVED | Noted: 2018-05-15 | Resolved: 2020-10-22

## 2020-10-22 PROBLEM — R00.1 BRADYCARDIA BY ELECTROCARDIOGRAM: Status: RESOLVED | Noted: 2017-03-11 | Resolved: 2020-10-22

## 2020-10-22 PROCEDURE — 99442 PR PHYS/QHP TELEPHONE EVALUATION 11-20 MIN: CPT | Performed by: NURSE PRACTITIONER

## 2020-10-22 RX ORDER — CITALOPRAM 10 MG/1
10 TABLET ORAL DAILY
Qty: 90 TABLET | Refills: 1 | Status: SHIPPED | OUTPATIENT
Start: 2020-10-22 | End: 2021-04-19 | Stop reason: SDUPTHER

## 2020-10-22 RX ORDER — LISINOPRIL 20 MG/1
20 TABLET ORAL DAILY
Qty: 90 TABLET | Refills: 1 | Status: SHIPPED | OUTPATIENT
Start: 2020-10-22 | End: 2021-04-19 | Stop reason: SDUPTHER

## 2020-10-22 RX ORDER — FLUOROURACIL 50 MG/G
CREAM TOPICAL
COMMUNITY
Start: 2020-10-05 | End: 2021-08-10

## 2020-10-22 NOTE — PROGRESS NOTES
Subjective     Chief Complaint:    Chief Complaint   Patient presents with   • Depression   • Hypertension     You have chosen to receive care through a telephone visit. Do you consent to use a telephone visit for your medical care today? Yes    History of Present Illness:   Has HTN. Takes lisinopril. Check his BP at home. Runs 120/74. No dizziness and headache.   Does have sinus node dysfunction and is s/p pacemaker implant. Follows with Dr. Chaparro.   Has hx of anxiety and irritable bowel.  Notes he was initially put on Celexa after his pacemaker placement.  He admits it does help improve his anxiety and IBS symptoms.  Would like to continue as he is tolerating well.  Has insomnia. Takes ambien but does take half tablet at night and will not take every night.     Review of Systems  Gen- No fevers, chills  CV- No chest pain, palpitations  Resp- No cough, dyspnea  GI- No N/V/D, abd pain  Neuro-No dizziness, headaches      I have reviewed and/or updated the patient's past medical, surgical, family, social history and problem list as appropriate.     Medications:    Current Outpatient Medications:   •  fluorouracil (EFUDEX) 5 % cream, , Disp: , Rfl:   •  fluticasone (FLONASE) 50 MCG/ACT nasal spray, SPRAY TWO SPRAYS IN EACH NOSTRIL DAILY, Disp: 1 bottle, Rfl: 3  •  zolpidem (AMBIEN) 10 MG tablet, TAKE 1/2 TO  1 TABLET TAKE TABLET BY MOUTH ONCE NIGHTLY AS NEEDED FOR SLEEP, Disp: 30 tablet, Rfl: 1  •  citalopram (CeleXA) 10 MG tablet, Take 1 tablet by mouth Daily., Disp: 90 tablet, Rfl: 1  •  lisinopril (PRINIVIL,ZESTRIL) 20 MG tablet, Take 1 tablet by mouth Daily., Disp: 90 tablet, Rfl: 1    Allergies:  Allergies   Allergen Reactions   • Levaquin [Levofloxacin] Hives       Objective     Vital Signs: There were no vitals filed for this visit.    Physical Exam:  Gen-no acute distress, telephone exam  Resp- normal WOB, able to speak in full sentences without distress  Neuro-A&Ox3, speech clear  Psych-appropriate  mood, cooperative       Assessment / Plan     Assessment/Plan:   Problem List Items Addressed This Visit        Cardiovascular and Mediastinum    Pacemaker    Essential hypertension - Primary    Overview     ECHO 3/2017- EF >70%, valves normal, no hypertrophy         Relevant Medications    lisinopril (PRINIVIL,ZESTRIL) 20 MG tablet    Other Relevant Orders    Comprehensive Metabolic Panel    Lipid Panel    CBC Auto Differential    Sinus node dysfunction (CMS/HCC)       Other    Situational insomnia    Relevant Medications    zolpidem (AMBIEN) 10 MG tablet    Dyslipidemia      Other Visit Diagnoses     CORNEL (generalized anxiety disorder)        Relevant Medications    citalopram (CeleXA) 10 MG tablet        --Blood pressure well controlled with lisinopril.  Continue.  Did discuss he needs to come back in for blood work  --Continue Celexa.  Working well for anxiety  --Labs as above  --Follows with cardiology for his sinus node dysfunction  --Continue Ambien as needed for insomnia.  Take sparingly.    Follow up:  6 months    Total Time of Encounter 15 minutes    Electronically signed by DANIS Rabago   10/22/2020 09:00 EDT      Please note that portions of this note may have been completed with a voice recognition program. Efforts were made to edit the dictations, but occasionally words are mistranscribed.

## 2020-10-23 ENCOUNTER — LAB (OUTPATIENT)
Dept: LAB | Facility: HOSPITAL | Age: 55
End: 2020-10-23

## 2020-10-23 DIAGNOSIS — I10 ESSENTIAL HYPERTENSION: ICD-10-CM

## 2020-10-23 LAB
ALBUMIN SERPL-MCNC: 4.2 G/DL (ref 3.5–5.2)
ALBUMIN/GLOB SERPL: 1.7 G/DL
ALP SERPL-CCNC: 82 U/L (ref 39–117)
ALT SERPL W P-5'-P-CCNC: 28 U/L (ref 1–41)
ANION GAP SERPL CALCULATED.3IONS-SCNC: 10.6 MMOL/L (ref 5–15)
AST SERPL-CCNC: 23 U/L (ref 1–40)
BASOPHILS # BLD AUTO: 0.04 10*3/MM3 (ref 0–0.2)
BASOPHILS NFR BLD AUTO: 0.6 % (ref 0–1.5)
BILIRUB SERPL-MCNC: 0.4 MG/DL (ref 0–1.2)
BUN SERPL-MCNC: 17 MG/DL (ref 6–20)
BUN/CREAT SERPL: 14.8 (ref 7–25)
CALCIUM SPEC-SCNC: 10 MG/DL (ref 8.6–10.5)
CHLORIDE SERPL-SCNC: 107 MMOL/L (ref 98–107)
CHOLEST SERPL-MCNC: 203 MG/DL (ref 0–200)
CO2 SERPL-SCNC: 24.4 MMOL/L (ref 22–29)
CREAT SERPL-MCNC: 1.15 MG/DL (ref 0.76–1.27)
DEPRECATED RDW RBC AUTO: 41.9 FL (ref 37–54)
EOSINOPHIL # BLD AUTO: 0.22 10*3/MM3 (ref 0–0.4)
EOSINOPHIL NFR BLD AUTO: 3.4 % (ref 0.3–6.2)
ERYTHROCYTE [DISTWIDTH] IN BLOOD BY AUTOMATED COUNT: 12.9 % (ref 12.3–15.4)
GFR SERPL CREATININE-BSD FRML MDRD: 66 ML/MIN/1.73
GLOBULIN UR ELPH-MCNC: 2.5 GM/DL
GLUCOSE SERPL-MCNC: 96 MG/DL (ref 65–99)
HCT VFR BLD AUTO: 43.7 % (ref 37.5–51)
HDLC SERPL-MCNC: 39 MG/DL (ref 40–60)
HGB BLD-MCNC: 15.3 G/DL (ref 13–17.7)
IMM GRANULOCYTES # BLD AUTO: 0.01 10*3/MM3 (ref 0–0.05)
IMM GRANULOCYTES NFR BLD AUTO: 0.2 % (ref 0–0.5)
LDLC SERPL CALC-MCNC: 141 MG/DL (ref 0–100)
LDLC/HDLC SERPL: 3.57 {RATIO}
LYMPHOCYTES # BLD AUTO: 1.99 10*3/MM3 (ref 0.7–3.1)
LYMPHOCYTES NFR BLD AUTO: 31.1 % (ref 19.6–45.3)
MCH RBC QN AUTO: 31.2 PG (ref 26.6–33)
MCHC RBC AUTO-ENTMCNC: 35 G/DL (ref 31.5–35.7)
MCV RBC AUTO: 89.2 FL (ref 79–97)
MONOCYTES # BLD AUTO: 0.6 10*3/MM3 (ref 0.1–0.9)
MONOCYTES NFR BLD AUTO: 9.4 % (ref 5–12)
NEUTROPHILS NFR BLD AUTO: 3.53 10*3/MM3 (ref 1.7–7)
NEUTROPHILS NFR BLD AUTO: 55.3 % (ref 42.7–76)
NRBC BLD AUTO-RTO: 0 /100 WBC (ref 0–0.2)
PLATELET # BLD AUTO: 221 10*3/MM3 (ref 140–450)
PMV BLD AUTO: 10.5 FL (ref 6–12)
POTASSIUM SERPL-SCNC: 4.2 MMOL/L (ref 3.5–5.2)
PROT SERPL-MCNC: 6.7 G/DL (ref 6–8.5)
RBC # BLD AUTO: 4.9 10*6/MM3 (ref 4.14–5.8)
SODIUM SERPL-SCNC: 142 MMOL/L (ref 136–145)
TRIGL SERPL-MCNC: 124 MG/DL (ref 0–150)
VLDLC SERPL-MCNC: 23 MG/DL (ref 5–40)
WBC # BLD AUTO: 6.39 10*3/MM3 (ref 3.4–10.8)

## 2020-10-23 PROCEDURE — 80061 LIPID PANEL: CPT | Performed by: NURSE PRACTITIONER

## 2020-10-23 PROCEDURE — 85025 COMPLETE CBC W/AUTO DIFF WBC: CPT | Performed by: NURSE PRACTITIONER

## 2020-10-23 PROCEDURE — 80053 COMPREHEN METABOLIC PANEL: CPT | Performed by: NURSE PRACTITIONER

## 2020-10-23 PROCEDURE — 36415 COLL VENOUS BLD VENIPUNCTURE: CPT

## 2020-10-28 NOTE — PROGRESS NOTES
Labs are stable.  Cholesterol remains mildly elevated.  I recommend he continue to work on decreasing sugary and starchy foods.  Clean eating.  Increasing physical activity.  May consider cholesterol medication in the future.  He could also look into some over-the-counter supplements such as Omega, artichoke extract, or garlic.  ASCVD 7.4% (borderline).

## 2020-12-10 DIAGNOSIS — F51.09 SITUATIONAL INSOMNIA: ICD-10-CM

## 2020-12-11 RX ORDER — ZOLPIDEM TARTRATE 10 MG/1
TABLET ORAL
Qty: 30 TABLET | Refills: 1 | Status: SHIPPED | OUTPATIENT
Start: 2020-12-11 | End: 2021-04-13

## 2021-01-25 RX ORDER — FLUTICASONE PROPIONATE 50 MCG
2 SPRAY, SUSPENSION (ML) NASAL DAILY
Qty: 16 G | Refills: 11 | Status: SHIPPED | OUTPATIENT
Start: 2021-01-25 | End: 2022-02-10 | Stop reason: SDUPTHER

## 2021-03-23 ENCOUNTER — BULK ORDERING (OUTPATIENT)
Dept: CASE MANAGEMENT | Facility: OTHER | Age: 56
End: 2021-03-23

## 2021-03-23 DIAGNOSIS — Z23 IMMUNIZATION DUE: ICD-10-CM

## 2021-03-30 PROCEDURE — 93296 REM INTERROG EVL PM/IDS: CPT | Performed by: INTERNAL MEDICINE

## 2021-03-30 PROCEDURE — 93294 REM INTERROG EVL PM/LDLS PM: CPT | Performed by: INTERNAL MEDICINE

## 2021-04-12 DIAGNOSIS — F51.09 SITUATIONAL INSOMNIA: ICD-10-CM

## 2021-04-13 RX ORDER — ZOLPIDEM TARTRATE 10 MG/1
TABLET ORAL
Qty: 30 TABLET | Refills: 0 | Status: SHIPPED | OUTPATIENT
Start: 2021-04-13 | End: 2021-06-08

## 2021-04-13 NOTE — TELEPHONE ENCOUNTER
1 month sent.  Needs appointment for further refills.  Has to be seen every 6 months as Ambien is a controlled substance.

## 2021-04-19 DIAGNOSIS — I10 ESSENTIAL HYPERTENSION: ICD-10-CM

## 2021-04-19 DIAGNOSIS — F41.1 GAD (GENERALIZED ANXIETY DISORDER): ICD-10-CM

## 2021-04-19 RX ORDER — CITALOPRAM 10 MG/1
10 TABLET ORAL DAILY
Qty: 90 TABLET | Refills: 0 | Status: SHIPPED | OUTPATIENT
Start: 2021-04-19 | End: 2021-07-14

## 2021-04-19 RX ORDER — LISINOPRIL 20 MG/1
20 TABLET ORAL DAILY
Qty: 90 TABLET | Refills: 0 | Status: SHIPPED | OUTPATIENT
Start: 2021-04-19 | End: 2021-07-14

## 2021-04-19 RX ORDER — CITALOPRAM 10 MG/1
TABLET ORAL
Qty: 90 TABLET | Refills: 0 | OUTPATIENT
Start: 2021-04-19

## 2021-04-19 RX ORDER — LISINOPRIL 20 MG/1
TABLET ORAL
Qty: 90 TABLET | Refills: 0 | OUTPATIENT
Start: 2021-04-19

## 2021-04-19 NOTE — TELEPHONE ENCOUNTER
Pt had regular f/u with [facility] last fall. Refills approved. He will need f/u apt in next 2-3 months

## 2021-06-07 DIAGNOSIS — F51.09 SITUATIONAL INSOMNIA: ICD-10-CM

## 2021-06-08 RX ORDER — ZOLPIDEM TARTRATE 10 MG/1
TABLET ORAL
Qty: 30 TABLET | Refills: 0 | Status: SHIPPED | OUTPATIENT
Start: 2021-06-08 | End: 2021-07-13

## 2021-07-10 DIAGNOSIS — F51.09 SITUATIONAL INSOMNIA: ICD-10-CM

## 2021-07-13 RX ORDER — ZOLPIDEM TARTRATE 10 MG/1
TABLET ORAL
Qty: 30 TABLET | Refills: 0 | Status: SHIPPED | OUTPATIENT
Start: 2021-07-13 | End: 2021-08-24 | Stop reason: SDUPTHER

## 2021-07-14 DIAGNOSIS — I10 ESSENTIAL HYPERTENSION: ICD-10-CM

## 2021-07-14 DIAGNOSIS — F41.1 GAD (GENERALIZED ANXIETY DISORDER): ICD-10-CM

## 2021-07-14 RX ORDER — CITALOPRAM 10 MG/1
10 TABLET ORAL DAILY
Qty: 90 TABLET | Refills: 0 | Status: SHIPPED | OUTPATIENT
Start: 2021-07-14 | End: 2021-08-13 | Stop reason: SDUPTHER

## 2021-07-14 RX ORDER — LISINOPRIL 20 MG/1
20 TABLET ORAL DAILY
Qty: 90 TABLET | Refills: 0 | Status: SHIPPED | OUTPATIENT
Start: 2021-07-14 | End: 2021-10-11 | Stop reason: SDUPTHER

## 2021-07-19 ENCOUNTER — TELEPHONE (OUTPATIENT)
Dept: FAMILY MEDICINE CLINIC | Facility: CLINIC | Age: 56
End: 2021-07-19

## 2021-08-10 ENCOUNTER — OFFICE VISIT (OUTPATIENT)
Dept: CARDIOLOGY | Facility: CLINIC | Age: 56
End: 2021-08-10

## 2021-08-10 ENCOUNTER — TELEPHONE (OUTPATIENT)
Dept: CARDIOLOGY | Facility: CLINIC | Age: 56
End: 2021-08-10

## 2021-08-10 VITALS
DIASTOLIC BLOOD PRESSURE: 80 MMHG | SYSTOLIC BLOOD PRESSURE: 148 MMHG | HEART RATE: 88 BPM | BODY MASS INDEX: 32.5 KG/M2 | WEIGHT: 227 LBS | HEIGHT: 70 IN | OXYGEN SATURATION: 98 %

## 2021-08-10 DIAGNOSIS — I10 ESSENTIAL HYPERTENSION: Primary | ICD-10-CM

## 2021-08-10 DIAGNOSIS — I49.5 SINUS NODE DYSFUNCTION (HCC): ICD-10-CM

## 2021-08-10 DIAGNOSIS — Z95.0 S/P PLACEMENT OF CARDIAC PACEMAKER: ICD-10-CM

## 2021-08-10 PROCEDURE — 93280 PM DEVICE PROGR EVAL DUAL: CPT | Performed by: PHYSICIAN ASSISTANT

## 2021-08-10 PROCEDURE — 99213 OFFICE O/P EST LOW 20 MIN: CPT | Performed by: PHYSICIAN ASSISTANT

## 2021-08-10 NOTE — PROGRESS NOTES
Tom Harvey  1965  PCP: Meghan Burrlel MD    SUBJECTIVE:   Tom Harvey is a 56 y.o. male seen for a follow up visit regarding the following:     Chief Complaint: Follow up for sinus arrest, pacemaker check, HTN     HPI:    Patient is a very pleasant 56 year old male with a history of sinus arrest s/p DDD PPM, syncope, and HTN that presents today for routine follow-up. He denies any anginal or heart failure symptoms. 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 BP has been controlled. No chest pain, sob, edema, palps, dizziness, or sycnope.       Cardiac PMH: (Old records have been reviewed and summarized below)  1. Sinus Arrest    A) 16 second pause resulting in syncope    B)status post implantation of Biotronik dual-chamber pacemaker, Dr. Barron 2017   2. syncope in setting of #1  3. hypertension  4. Fatigue, chronic      Current Outpatient Medications:   •  citalopram (CeleXA) 10 MG tablet, Take 1 tablet by mouth Daily., Disp: 90 tablet, Rfl: 0  •  fluticasone (FLONASE) 50 MCG/ACT nasal spray, Spray 2 sprays in Each Nostril Once Daily., Disp: 16 g, Rfl: 11  •  lisinopril (PRINIVIL,ZESTRIL) 20 MG tablet, Take 1 tablet by mouth Daily., Disp: 90 tablet, Rfl: 0  •  zolpidem (AMBIEN) 10 MG tablet, TAKE 1/2 TO 1 TABLET BY MOUTH NIGHTLY AS NEEDED FOR SLEEP, Disp: 30 tablet, Rfl: 0    Past Medical History, Past Surgical History, Family history, Social History, and Medications were all reviewed with the patient today and updated as necessary.       Patient Active Problem List   Diagnosis   • S/P placement of cardiac pacemaker   • Pacemaker   • Class 1 obesity due to excess calories with serious comorbidity and body mass index (BMI) of 31.0 to 31.9 in adult   • Situational insomnia   • Essential hypertension   • Dyslipidemia   • DDD (degenerative disc disease), cervical   • Sinus node dysfunction (CMS/HCC)     Allergies   Allergen Reactions   • Levaquin [Levofloxacin]  "Hives     Past Medical History:   Diagnosis Date   • Asystole by electrocardiogram (CMS/[facility]) 08/19/1999    PAC   • Hypertension    • Hypogonadism in male 6/17/2018   • Irritable bowel syndrome with diarrhea 5/15/2018   • Osteoarthritis of spine with radiculopathy, cervical region 12/21/2018   • Pacemaker    • Polyarthralgia 5/15/2018   • Syncope and collapse 3/11/2017   • Vitamin B 12 deficiency 6/17/2018   • Wears glasses      Past Surgical History:   Procedure Laterality Date   • CARDIAC ELECTROPHYSIOLOGY PROCEDURE N/A 3/13/2017    Procedure: Pacemaker DC new;  Surgeon: Be Barron MD;  Location:  [facility];  Service:    • COLONOSCOPY N/A 7/6/2018    Procedure: COLONOSCOPY;  Surgeon: Hong Silverio MD;  Location:  [facility];  Service: Gastroenterology   • KNEE ARTHROPLASTY UNICOMPARTMENTAL BILATERAL     • PACEMAKER IMPLANTATION       Family History   Problem Relation Age of Onset   • Stroke Mother 43   • Heart disease Father    • Dysphagia Father      Social History     Tobacco Use   • Smoking status: Never Smoker   • Smokeless tobacco: Never Used   Substance Use Topics   • Alcohol use: No         PHYSICAL EXAM:    /80 (BP Location: Left arm, Patient Position: Sitting)   Pulse 88   Ht 177.8 cm (70\")   Wt 103 kg (227 lb)   SpO2 98%   BMI 32.57 kg/m²        Wt Readings from Last 5 Encounters:   08/10/21 103 kg (227 lb)   08/04/20 98.4 kg (217 lb)   10/02/19 101 kg (222 lb 2 oz)   07/30/19 98.1 kg (216 lb 3.2 oz)   02/12/19 102 kg (224 lb)       BP Readings from Last 5 Encounters:   08/10/21 148/80   08/04/20 106/68   10/02/19 130/80   07/30/19 130/80   12/14/18 128/80       General-Well Nourished, Well developed  Eyes - PERRLA  Neck- supple, No mass  CV- regular rate and rhythm, no MRG, No edema  Lung- clear bilaterally  Abd- soft, +BS  Musc/skel - Norm strength and range of motion  Skin- warm and dry  Neuro - Alert & Oriented x 3, appropriate mood.        Medical problems " and test results were reviewed with the patient today.     No results found for this or any previous visit (from the past 672 hour(s)).      EKG: (EKG has been independently visualized by me and summarized below)    Procedures     ASSESSMENT and PLAN    1. Sinus arrest s/p implantation of BTK DDD PPM  2. Syncope- secondary to #1. No recurrence  3. Biotronik Dual Chamber Pacemaker: normal function. Stable threshold and impedence. A pace 77%; no events. Battery 70%.   4. Hypertension: controlled. Continue Lisinopril.       Return in about 1 year (around 8/10/2022) for BTK.        Anuradha Lilly PA-C   Cardiology/Electrophysiology  8/10/2021  15:57 EDT

## 2021-08-13 ENCOUNTER — OFFICE VISIT (OUTPATIENT)
Dept: FAMILY MEDICINE CLINIC | Facility: CLINIC | Age: 56
End: 2021-08-13

## 2021-08-13 VITALS
RESPIRATION RATE: 20 BRPM | BODY MASS INDEX: 32.5 KG/M2 | SYSTOLIC BLOOD PRESSURE: 142 MMHG | DIASTOLIC BLOOD PRESSURE: 82 MMHG | HEART RATE: 78 BPM | HEIGHT: 70 IN | WEIGHT: 227 LBS | OXYGEN SATURATION: 98 %

## 2021-08-13 DIAGNOSIS — I49.5 SINUS NODE DYSFUNCTION (HCC): ICD-10-CM

## 2021-08-13 DIAGNOSIS — M79.671 RIGHT FOOT PAIN: Primary | ICD-10-CM

## 2021-08-13 PROCEDURE — 99213 OFFICE O/P EST LOW 20 MIN: CPT | Performed by: NURSE PRACTITIONER

## 2021-08-13 RX ORDER — CITALOPRAM 10 MG/1
10 TABLET ORAL DAILY
Qty: 90 TABLET | Refills: 0 | Status: SHIPPED | OUTPATIENT
Start: 2021-08-13 | End: 2021-12-02 | Stop reason: SDUPTHER

## 2021-08-13 NOTE — PATIENT INSTRUCTIONS
Plantar Fasciitis Rehab  Ask your health care provider which exercises are safe for you. Do exercises exactly as told by your health care provider and adjust them as directed. It is normal to feel mild stretching, pulling, tightness, or discomfort as you do these exercises. Stop right away if you feel sudden pain or your pain gets worse. Do not begin these exercises until told by your health care provider.  Stretching and range-of-motion exercises  These exercises warm up your muscles and joints and improve the movement and flexibility of your foot. These exercises also help to relieve pain.  Plantar fascia stretch    1. Sit with your left / right leg crossed over your opposite knee.  2. Hold your heel with one hand with that thumb near your arch. With your other hand, hold your toes and gently pull them back toward the top of your foot. You should feel a stretch on the bottom of your toes or your foot (plantar fascia) or both.  3. Hold this stretch for__________ seconds.  4. Slowly release your toes and return to the starting position.  Repeat __________ times. Complete this exercise __________ times a day.  Gastrocnemius stretch, standing  This exercise is also called a calf (gastroc) stretch. It stretches the muscles in the back of the upper calf.  1. Stand with your hands against a wall.  2. Extend your left / right leg behind you, and bend your front knee slightly.  3. Keeping your heels on the floor and your back knee straight, shift your weight toward the wall. Do not arch your back. You should feel a gentle stretch in your upper left / right calf.  4. Hold this position for __________ seconds.  Repeat __________ times. Complete this exercise __________ times a day.  Soleus stretch, standing  This exercise is also called a calf (soleus) stretch. It stretches the muscles in the back of the lower calf.  1. Stand with your hands against a wall.  2. Extend your left / right leg behind you, and bend your front  knee slightly.  3. Keeping your heels on the floor, bend your back knee and shift your weight slightly over your back leg. You should feel a gentle stretch deep in your lower calf.  4. Hold this position for __________ seconds.  Repeat __________ times. Complete this exercise __________ times a day.  Gastroc and soleus stretch, standing step  This exercise stretches the muscles in the back of the lower leg. These muscles are in the upper calf (gastrocnemius) and the lower calf (soleus).  1. Stand with the ball of your left / right foot on a step. The ball of your foot is on the walking surface, right under your toes.  2. Keep your other foot firmly on the same step.  3. Hold on to the wall or a railing for balance.  4. Slowly lift your other foot, allowing your body weight to press your left / right heel down over the edge of the step. You should feel a stretch in your left / right calf.  5. Hold this position for __________ seconds.  6. Return both feet to the step.  7. Repeat this exercise with a slight bend in your left / right knee.  Repeat __________ times with your left / right knee straight and __________ times with your left / right knee bent. Complete this exercise __________ times a day.  Balance exercise  This exercise builds your balance and strength control of your arch to help take pressure off your plantar fascia.  Single leg stand  If this exercise is too easy, you can try it with your eyes closed or while standing on a pillow.  1. Without shoes, stand near a railing or in a doorway. You may hold on to the railing or door frame as needed.  2. Stand on your left / right foot. Keep your big toe down on the floor and try to keep your arch lifted. Do not let your foot roll inward.  3. Hold this position for __________ seconds.  Repeat __________ times. Complete this exercise __________ times a day.  This information is not intended to replace advice given to you by your health care provider. Make sure  you discuss any questions you have with your health care provider.  Document Revised: 04/09/2020 Document Reviewed: 10/16/2019  Elsevier Patient Education © 2021 [employer]

## 2021-08-13 NOTE — PROGRESS NOTES
"      Subjective     Chief Complaint:    Chief Complaint   Patient presents with   • Foot Pain     R heel        History of Present Illness:   Right heel pain that is very tender. Worse when he has been resting and then gets up on. Also aches if he is walking on it a lot.  Hurts in the morning.  This has been occurring for months.  He denies any issues with anxiety.  Notes he was started on Celexa during his hospitalization after syncopal event and electrophysiologist explained to him that this would help his cardiac activity.      Review of Systems  Gen- No fevers, chills  CV- No chest pain, palpitations  Resp- No cough, dyspnea  GI- No N/V/D, abd pain  Neuro-No dizziness, headaches      I have reviewed and/or updated the patient's past medical, surgical, family, social history and problem list as appropriate.     Medications:    Current Outpatient Medications:   •  citalopram (CeleXA) 10 MG tablet, Take 1 tablet by mouth Daily., Disp: 90 tablet, Rfl: 0  •  fluticasone (FLONASE) 50 MCG/ACT nasal spray, Spray 2 sprays in Each Nostril Once Daily., Disp: 16 g, Rfl: 11  •  lisinopril (PRINIVIL,ZESTRIL) 20 MG tablet, Take 1 tablet by mouth Daily., Disp: 90 tablet, Rfl: 0  •  zolpidem (AMBIEN) 10 MG tablet, TAKE 1/2 TO 1 TABLET BY MOUTH NIGHTLY AS NEEDED FOR SLEEP, Disp: 30 tablet, Rfl: 0    Allergies:  Allergies   Allergen Reactions   • Levaquin [Levofloxacin] Hives       Objective     Vital Signs:   Vitals:    08/13/21 1613   BP: 142/82   Pulse: 78   Resp: 20   SpO2: 98%   Weight: 103 kg (227 lb)   Height: 177.8 cm (70\")       Physical Exam:    Physical Exam  Vitals and nursing note reviewed.   Constitutional:       Appearance: He is well-developed.   HENT:      Head: Normocephalic and atraumatic.   Eyes:      Pupils: Pupils are equal, round, and reactive to light.   Cardiovascular:      Rate and Rhythm: Normal rate and regular rhythm.      Heart sounds: Normal heart sounds.   Pulmonary:      Effort: Pulmonary effort " is normal.      Breath sounds: Normal breath sounds.   Abdominal:      General: Bowel sounds are normal. There is no distension.      Palpations: Abdomen is soft.      Tenderness: There is no abdominal tenderness.   Musculoskeletal:      Cervical back: Neck supple.      Comments: Right foot exam normal aside from mild tenderness to heel   Skin:     General: Skin is warm and dry.      Capillary Refill: Capillary refill takes less than 2 seconds.   Neurological:      General: No focal deficit present.      Mental Status: He is alert and oriented to person, place, and time.   Psychiatric:         Mood and Affect: Mood normal.         Behavior: Behavior normal.         Body mass index is 32.57 kg/m².    Assessment / Plan     Assessment/Plan:   Problem List Items Addressed This Visit        Cardiac and Vasculature    Sinus node dysfunction (CMS/HCC)    Relevant Medications    citalopram (CeleXA) 10 MG tablet      Other Visit Diagnoses     Right foot pain    -  Primary    Relevant Orders    XR Foot 3+ View Right (Completed)    Ambulatory Referral to Podiatry        --Foot symptoms sound like plantar fasciitis but not in the typical area.  We discussed supportive care and stretching for this.  Check x-ray.  Will refer him to podiatry given length of pain.    Follow up:  As needed    Electronically signed by DANIS Rabago   08/13/2021 16:50 EDT      Please note that portions of this note may have been completed with a voice recognition program. Efforts were made to edit the dictations, but occasionally words are mistranscribed.

## 2021-08-16 ENCOUNTER — HOSPITAL ENCOUNTER (OUTPATIENT)
Dept: GENERAL RADIOLOGY | Facility: HOSPITAL | Age: 56
Discharge: HOME | End: 2021-08-16
Admitting: NURSE PRACTITIONER

## 2021-08-16 DIAGNOSIS — M79.671 RIGHT FOOT PAIN: ICD-10-CM

## 2021-08-16 PROCEDURE — 73630 X-RAY EXAM OF FOOT: CPT

## 2021-08-17 ENCOUNTER — TELEPHONE (OUTPATIENT)
Dept: FAMILY MEDICINE CLINIC | Facility: CLINIC | Age: 56
End: 2021-08-17

## 2021-08-17 NOTE — TELEPHONE ENCOUNTER
Patient said that esequiel was going to ask you about pt getting a covid vaccine exemption letter/form due to medical reasons. He wanted me to ask about this and if you would be able to do this for him?

## 2021-08-19 ENCOUNTER — TELEPHONE (OUTPATIENT)
Dept: FAMILY MEDICINE CLINIC | Facility: CLINIC | Age: 56
End: 2021-08-19

## 2021-08-19 NOTE — TELEPHONE ENCOUNTER
Patient is requesting waiver for covid vaccine. There is a previous encounter on this as well. Please advise? thank you

## 2021-08-19 NOTE — TELEPHONE ENCOUNTER
Caller: Tom Harvey    Relationship: Self    Best call back number: [phone]    What is the best time to reach you:ANYTIME    Who are you requesting to speak with (clinical staff, provider,  specific staff member): CLINICAL STAFF    Do you know the name of the person who called:     What was the call regarding: WAIVER FOR COVID VACCINE    Do you require a callback: YES

## 2021-08-19 NOTE — TELEPHONE ENCOUNTER
If contraindication for COVID vaccine is related to previous syncope or cardiac event, then the waiver would have to some from his cardiologist.

## 2021-08-24 DIAGNOSIS — F51.09 SITUATIONAL INSOMNIA: ICD-10-CM

## 2021-08-24 RX ORDER — ZOLPIDEM TARTRATE 10 MG/1
TABLET ORAL
Qty: 30 TABLET | Refills: 2 | Status: SHIPPED | OUTPATIENT
Start: 2021-08-24 | End: 2022-02-22 | Stop reason: SDUPTHER

## 2021-09-07 PROBLEM — K58.0 IRRITABLE BOWEL SYNDROME WITH DIARRHEA: Status: ACTIVE | Noted: 2018-05-15

## 2021-09-28 PROCEDURE — 93294 REM INTERROG EVL PM/LDLS PM: CPT | Performed by: STUDENT IN AN ORGANIZED HEALTH CARE EDUCATION/TRAINING PROGRAM

## 2021-09-28 PROCEDURE — 93296 REM INTERROG EVL PM/IDS: CPT | Performed by: STUDENT IN AN ORGANIZED HEALTH CARE EDUCATION/TRAINING PROGRAM

## 2021-10-11 DIAGNOSIS — I10 ESSENTIAL HYPERTENSION: ICD-10-CM

## 2021-10-11 RX ORDER — LISINOPRIL 20 MG/1
20 TABLET ORAL DAILY
Qty: 90 TABLET | Refills: 0 | Status: SHIPPED | OUTPATIENT
Start: 2021-10-11 | End: 2022-01-04 | Stop reason: SDUPTHER

## 2021-12-02 DIAGNOSIS — F41.1 GAD (GENERALIZED ANXIETY DISORDER): ICD-10-CM

## 2021-12-02 RX ORDER — CITALOPRAM 10 MG/1
10 TABLET ORAL DAILY
Qty: 90 TABLET | Refills: 0 | Status: SHIPPED | OUTPATIENT
Start: 2021-12-02 | End: 2022-03-01 | Stop reason: SDUPTHER

## 2022-01-04 DIAGNOSIS — I10 ESSENTIAL HYPERTENSION: ICD-10-CM

## 2022-01-04 RX ORDER — LISINOPRIL 20 MG/1
20 TABLET ORAL DAILY
Qty: 90 TABLET | Refills: 0 | Status: SHIPPED | OUTPATIENT
Start: 2022-01-04 | End: 2022-02-28 | Stop reason: SDUPTHER

## 2022-02-10 RX ORDER — FLUTICASONE PROPIONATE 50 MCG
2 SPRAY, SUSPENSION (ML) NASAL DAILY
Qty: 16 G | Refills: 11 | Status: SHIPPED | OUTPATIENT
Start: 2022-02-10 | End: 2023-01-23 | Stop reason: SDUPTHER

## 2022-02-22 DIAGNOSIS — F51.09 SITUATIONAL INSOMNIA: ICD-10-CM

## 2022-02-22 RX ORDER — ZOLPIDEM TARTRATE 10 MG/1
TABLET ORAL
Qty: 30 TABLET | Refills: 1 | Status: SHIPPED | OUTPATIENT
Start: 2022-02-22 | End: 2022-05-15 | Stop reason: SDUPTHER

## 2022-02-22 NOTE — TELEPHONE ENCOUNTER
ROSALINA reviewed. Refill approved. Medication E rx'd to pharmacy as requested.    Pt needs to schedule routine f/u apt with me as I have not seen him since 10/2019

## 2022-02-28 ENCOUNTER — OFFICE VISIT (OUTPATIENT)
Dept: FAMILY MEDICINE CLINIC | Facility: CLINIC | Age: 57
End: 2022-02-28

## 2022-02-28 VITALS
DIASTOLIC BLOOD PRESSURE: 78 MMHG | TEMPERATURE: 98.1 F | BODY MASS INDEX: 32.1 KG/M2 | OXYGEN SATURATION: 96 % | HEART RATE: 101 BPM | SYSTOLIC BLOOD PRESSURE: 142 MMHG | WEIGHT: 224.2 LBS | HEIGHT: 70 IN

## 2022-02-28 DIAGNOSIS — E78.5 DYSLIPIDEMIA: ICD-10-CM

## 2022-02-28 DIAGNOSIS — F41.1 GAD (GENERALIZED ANXIETY DISORDER): ICD-10-CM

## 2022-02-28 DIAGNOSIS — Z11.59 NEED FOR HEPATITIS C SCREENING TEST: ICD-10-CM

## 2022-02-28 DIAGNOSIS — I10 ESSENTIAL HYPERTENSION: ICD-10-CM

## 2022-02-28 DIAGNOSIS — R79.89 LOW TESTOSTERONE IN MALE: ICD-10-CM

## 2022-02-28 DIAGNOSIS — Z12.5 SCREENING FOR PROSTATE CANCER: ICD-10-CM

## 2022-02-28 DIAGNOSIS — Z00.00 WELL ADULT EXAM: Primary | ICD-10-CM

## 2022-02-28 DIAGNOSIS — R06.02 SHORTNESS OF BREATH: ICD-10-CM

## 2022-02-28 DIAGNOSIS — Z13.1 SCREENING FOR DIABETES MELLITUS: ICD-10-CM

## 2022-02-28 DIAGNOSIS — I10 UNCONTROLLED HYPERTENSION: ICD-10-CM

## 2022-02-28 DIAGNOSIS — E53.8 VITAMIN B 12 DEFICIENCY: ICD-10-CM

## 2022-02-28 PROCEDURE — 99396 PREV VISIT EST AGE 40-64: CPT | Performed by: FAMILY MEDICINE

## 2022-02-28 PROCEDURE — 99214 OFFICE O/P EST MOD 30 MIN: CPT | Performed by: FAMILY MEDICINE

## 2022-02-28 RX ORDER — LISINOPRIL 30 MG/1
30 TABLET ORAL DAILY
Qty: 90 TABLET | Refills: 3 | Status: SHIPPED | OUTPATIENT
Start: 2022-02-28 | End: 2022-11-28 | Stop reason: SDUPTHER

## 2022-03-01 ENCOUNTER — HOSPITAL ENCOUNTER (OUTPATIENT)
Dept: GENERAL RADIOLOGY | Facility: HOSPITAL | Age: 57
Discharge: HOME | End: 2022-03-01
Admitting: FAMILY MEDICINE

## 2022-03-01 DIAGNOSIS — R06.02 SHORTNESS OF BREATH: ICD-10-CM

## 2022-03-01 PROBLEM — F41.1 GAD (GENERALIZED ANXIETY DISORDER): Status: ACTIVE | Noted: 2022-03-01

## 2022-03-01 PROCEDURE — 71046 X-RAY EXAM CHEST 2 VIEWS: CPT

## 2022-03-01 RX ORDER — CITALOPRAM 10 MG/1
10 TABLET ORAL DAILY
Qty: 90 TABLET | Refills: 3 | Status: SHIPPED | OUTPATIENT
Start: 2022-03-01 | End: 2022-11-28 | Stop reason: SDUPTHER

## 2022-03-02 ENCOUNTER — LAB (OUTPATIENT)
Dept: LAB | Facility: HOSPITAL | Age: 57
End: 2022-03-02

## 2022-03-02 DIAGNOSIS — I10 UNCONTROLLED HYPERTENSION: ICD-10-CM

## 2022-03-02 DIAGNOSIS — Z13.1 SCREENING FOR DIABETES MELLITUS: ICD-10-CM

## 2022-03-02 DIAGNOSIS — R06.02 SHORTNESS OF BREATH: ICD-10-CM

## 2022-03-02 DIAGNOSIS — Z11.59 NEED FOR HEPATITIS C SCREENING TEST: ICD-10-CM

## 2022-03-02 DIAGNOSIS — E53.8 VITAMIN B 12 DEFICIENCY: ICD-10-CM

## 2022-03-02 DIAGNOSIS — Z00.00 WELL ADULT EXAM: ICD-10-CM

## 2022-03-02 DIAGNOSIS — Z12.5 SCREENING FOR PROSTATE CANCER: ICD-10-CM

## 2022-03-02 DIAGNOSIS — R79.89 LOW TESTOSTERONE IN MALE: ICD-10-CM

## 2022-03-02 DIAGNOSIS — E78.5 DYSLIPIDEMIA: ICD-10-CM

## 2022-03-02 LAB
BASOPHILS # BLD AUTO: 0.04 10*3/MM3 (ref 0–0.2)
BASOPHILS NFR BLD AUTO: 0.4 % (ref 0–1.5)
DEPRECATED RDW RBC AUTO: 41.8 FL (ref 37–54)
EOSINOPHIL # BLD AUTO: 0.23 10*3/MM3 (ref 0–0.4)
EOSINOPHIL NFR BLD AUTO: 2.5 % (ref 0.3–6.2)
ERYTHROCYTE [DISTWIDTH] IN BLOOD BY AUTOMATED COUNT: 12.7 % (ref 12.3–15.4)
HBA1C MFR BLD: 5.9 % (ref 4.8–5.6)
HCT VFR BLD AUTO: 45.2 % (ref 37.5–51)
HGB BLD-MCNC: 14.9 G/DL (ref 13–17.7)
IMM GRANULOCYTES # BLD AUTO: 0.02 10*3/MM3 (ref 0–0.05)
IMM GRANULOCYTES NFR BLD AUTO: 0.2 % (ref 0–0.5)
LYMPHOCYTES # BLD AUTO: 1.88 10*3/MM3 (ref 0.7–3.1)
LYMPHOCYTES NFR BLD AUTO: 20.8 % (ref 19.6–45.3)
MCH RBC QN AUTO: 30.1 PG (ref 26.6–33)
MCHC RBC AUTO-ENTMCNC: 33 G/DL (ref 31.5–35.7)
MCV RBC AUTO: 91.3 FL (ref 79–97)
MONOCYTES # BLD AUTO: 1.04 10*3/MM3 (ref 0.1–0.9)
MONOCYTES NFR BLD AUTO: 11.5 % (ref 5–12)
NEUTROPHILS NFR BLD AUTO: 5.84 10*3/MM3 (ref 1.7–7)
NEUTROPHILS NFR BLD AUTO: 64.6 % (ref 42.7–76)
NRBC BLD AUTO-RTO: 0 /100 WBC (ref 0–0.2)
PLATELET # BLD AUTO: 234 10*3/MM3 (ref 140–450)
PMV BLD AUTO: 10.5 FL (ref 6–12)
RBC # BLD AUTO: 4.95 10*6/MM3 (ref 4.14–5.8)
WBC NRBC COR # BLD: 9.05 10*3/MM3 (ref 3.4–10.8)

## 2022-03-02 PROCEDURE — 80061 LIPID PANEL: CPT

## 2022-03-02 PROCEDURE — 83036 HEMOGLOBIN GLYCOSYLATED A1C: CPT

## 2022-03-02 PROCEDURE — G0103 PSA SCREENING: HCPCS

## 2022-03-02 PROCEDURE — 86803 HEPATITIS C AB TEST: CPT

## 2022-03-02 PROCEDURE — 84403 ASSAY OF TOTAL TESTOSTERONE: CPT

## 2022-03-02 PROCEDURE — 36415 COLL VENOUS BLD VENIPUNCTURE: CPT

## 2022-03-02 PROCEDURE — 83880 ASSAY OF NATRIURETIC PEPTIDE: CPT

## 2022-03-02 PROCEDURE — 80050 GENERAL HEALTH PANEL: CPT

## 2022-03-02 PROCEDURE — 82607 VITAMIN B-12: CPT

## 2022-03-03 LAB
ALBUMIN SERPL-MCNC: 4.2 G/DL (ref 3.5–5.2)
ALBUMIN/GLOB SERPL: 1.5 G/DL
ALP SERPL-CCNC: 87 U/L (ref 39–117)
ALT SERPL W P-5'-P-CCNC: 23 U/L (ref 1–41)
ANION GAP SERPL CALCULATED.3IONS-SCNC: 14.2 MMOL/L (ref 5–15)
AST SERPL-CCNC: 20 U/L (ref 1–40)
BILIRUB SERPL-MCNC: 0.4 MG/DL (ref 0–1.2)
BUN SERPL-MCNC: 16 MG/DL (ref 6–20)
BUN/CREAT SERPL: 13.3 (ref 7–25)
CALCIUM SPEC-SCNC: 9.8 MG/DL (ref 8.6–10.5)
CHLORIDE SERPL-SCNC: 108 MMOL/L (ref 98–107)
CHOLEST SERPL-MCNC: 198 MG/DL (ref 0–200)
CO2 SERPL-SCNC: 20.8 MMOL/L (ref 22–29)
CREAT SERPL-MCNC: 1.2 MG/DL (ref 0.76–1.27)
EGFRCR SERPLBLD CKD-EPI 2021: 70.5 ML/MIN/1.73
GLOBULIN UR ELPH-MCNC: 2.8 GM/DL
GLUCOSE SERPL-MCNC: 92 MG/DL (ref 65–99)
HCV AB SER DONR QL: NORMAL
HDLC SERPL-MCNC: 34 MG/DL (ref 40–60)
LDLC SERPL CALC-MCNC: 146 MG/DL (ref 0–100)
LDLC/HDLC SERPL: 4.25 {RATIO}
NT-PROBNP SERPL-MCNC: 56.6 PG/ML (ref 0–900)
POTASSIUM SERPL-SCNC: 4.1 MMOL/L (ref 3.5–5.2)
PROT SERPL-MCNC: 7 G/DL (ref 6–8.5)
PSA SERPL-MCNC: 1.97 NG/ML (ref 0–4)
SODIUM SERPL-SCNC: 143 MMOL/L (ref 136–145)
TESTOST SERPL-MCNC: 229 NG/DL (ref 193–740)
TRIGL SERPL-MCNC: 98 MG/DL (ref 0–150)
TSH SERPL DL<=0.05 MIU/L-ACNC: 1.69 UIU/ML (ref 0.27–4.2)
VIT B12 BLD-MCNC: 362 PG/ML (ref 211–946)
VLDLC SERPL-MCNC: 18 MG/DL (ref 5–40)

## 2022-03-29 PROCEDURE — 93294 REM INTERROG EVL PM/LDLS PM: CPT | Performed by: STUDENT IN AN ORGANIZED HEALTH CARE EDUCATION/TRAINING PROGRAM

## 2022-03-29 PROCEDURE — 93296 REM INTERROG EVL PM/IDS: CPT | Performed by: STUDENT IN AN ORGANIZED HEALTH CARE EDUCATION/TRAINING PROGRAM

## 2022-05-02 ENCOUNTER — OFFICE VISIT (OUTPATIENT)
Dept: FAMILY MEDICINE CLINIC | Facility: CLINIC | Age: 57
End: 2022-05-02

## 2022-05-02 VITALS
OXYGEN SATURATION: 96 % | DIASTOLIC BLOOD PRESSURE: 80 MMHG | HEART RATE: 80 BPM | BODY MASS INDEX: 32.35 KG/M2 | TEMPERATURE: 97 F | WEIGHT: 226 LBS | SYSTOLIC BLOOD PRESSURE: 138 MMHG | HEIGHT: 70 IN

## 2022-05-02 DIAGNOSIS — I10 ESSENTIAL HYPERTENSION: Primary | ICD-10-CM

## 2022-05-02 DIAGNOSIS — E78.5 DYSLIPIDEMIA: ICD-10-CM

## 2022-05-02 DIAGNOSIS — E66.09 CLASS 1 OBESITY DUE TO EXCESS CALORIES WITH SERIOUS COMORBIDITY AND BODY MASS INDEX (BMI) OF 32.0 TO 32.9 IN ADULT: ICD-10-CM

## 2022-05-02 PROCEDURE — 99214 OFFICE O/P EST MOD 30 MIN: CPT | Performed by: FAMILY MEDICINE

## 2022-05-02 RX ORDER — MELOXICAM 15 MG/1
15 TABLET ORAL DAILY PRN
Qty: 90 TABLET | Refills: 3 | Status: SHIPPED | OUTPATIENT
Start: 2022-05-02 | End: 2022-08-16 | Stop reason: SDUPTHER

## 2022-05-02 NOTE — PROGRESS NOTES
Subjective   Tom Harvey is a 57 y.o. male.     History of Present Illness   Mr. Harvey presents today for recheck of blood pressure.  Seen approximately 2 months ago, noted to have uncontrolled hypertension.  Lisinopril increased to 30 mg daily.  He has been taking as prescribed.  Generally blood pressure in the 130s over 70s at home.  Very rare elevations.  Much improved control from previous.    Has associated mild dyslipidemia.  The 10-year ASCVD risk score (Jenningsdivya ORTIZ Jr., et al., 2013) is: 11.9%    Values used to calculate the score:      Age: 57 years      Sex: Male      Is Non- : No      Diabetic: No      Tobacco smoker: No      Systolic Blood Pressure: 138 mmHg      Is BP treated: Yes      HDL Cholesterol: 34 mg/dL      Total Cholesterol: 198 mg/dL    The following portions of the patient's history were reviewed and updated as appropriate: allergies, current medications, past family history, past medical history, past social history, past surgical history and problem list.    Review of Systems   Constitutional: Positive for fatigue. Negative for fever.   HENT: Negative for mouth sores, nosebleeds, sore throat and trouble swallowing.    Eyes: Negative for visual disturbance.   Respiratory: Negative for cough and shortness of breath.    Cardiovascular: Negative for chest pain, palpitations and leg swelling.   Gastrointestinal: Negative for abdominal pain, blood in stool, diarrhea, nausea and vomiting.   Genitourinary: Negative for dysuria and hematuria.   Skin: Negative for rash.   Neurological: Negative for seizures, syncope, facial asymmetry, speech difficulty, weakness and headaches.   Hematological: Negative for adenopathy. Does not bruise/bleed easily.   Psychiatric/Behavioral: Positive for dysphoric mood (mildly) and sleep disturbance. Negative for confusion and suicidal ideas. The patient is nervous/anxious (mildly).    Pt's previous ROS reviewed and updated as  indicated.       Objective    Vitals:    05/02/22 1434   BP: 138/80   Pulse: 80   Temp: 97 °F (36.1 °C)   SpO2: 96%     Body mass index is 32.43 kg/m².      05/02/22  1434   Weight: 103 kg (226 lb)       Physical Exam  Vitals and nursing note reviewed.   Constitutional:       General: He is not in acute distress.     Appearance: He is well-developed and well-groomed. He is obese. He is not ill-appearing.   Eyes:      General: No scleral icterus.  Cardiovascular:      Rate and Rhythm: Normal rate and regular rhythm.      Pulses: Normal pulses.      Heart sounds: Normal heart sounds.   Pulmonary:      Effort: Pulmonary effort is normal.      Breath sounds: Normal breath sounds.   Musculoskeletal:      Right lower leg: No edema.      Left lower leg: No edema.   Skin:     General: Skin is warm and dry.      Coloration: Skin is not jaundiced or pale.   Neurological:      Mental Status: He is alert and oriented to person, place, and time.      Gait: Gait is intact.   Psychiatric:         Behavior: Behavior normal. Behavior is cooperative.         Cognition and Memory: Cognition normal.       Lab Results   Component Value Date    WBC 9.05 03/02/2022    HGB 14.9 03/02/2022    HCT 45.2 03/02/2022    MCV 91.3 03/02/2022     03/02/2022       Lab Results   Component Value Date    GLUCOSE 92 03/02/2022    BUN 16 03/02/2022    CREATININE 1.20 03/02/2022    EGFRIFNONA 66 10/23/2020    EGFRIFAFRI 95 05/15/2018    BCR 13.3 03/02/2022    K 4.1 03/02/2022    CO2 20.8 (L) 03/02/2022    CALCIUM 9.8 03/02/2022    PROTENTOTREF 7.1 05/15/2018    ALBUMIN 4.20 03/02/2022    LABIL2 1.5 05/15/2018    AST 20 03/02/2022    ALT 23 03/02/2022       Lab Results   Component Value Date    CHOL 198 03/02/2022    CHLPL 205 (H) 05/15/2018    TRIG 98 03/02/2022    HDL 34 (L) 03/02/2022     (H) 03/02/2022       Lab Results   Component Value Date    HGBA1C 5.90 (H) 03/02/2022       Lab Results   Component Value Date    TSH 1.690 03/02/2022          Assessment/Plan   Diagnoses and all orders for this visit:    1. Essential hypertension (Primary)    2. Class 1 obesity due to excess calories with serious comorbidity and body mass index (BMI) of 32.0 to 32.9 in adult    3. Dyslipidemia    Other orders  -     meloxicam (MOBIC) 15 MG tablet; Take 1 tablet by mouth Daily As Needed for Moderate Pain .  Dispense: 90 tablet; Refill: 3       Hypertension with improving control on increased dose of lisinopril to 30 mg once daily.  He is reminded of importance of lifestyle modification for management of hypertension.  He is aware that Mobic cannot increase blood pressure as well.  Continue lisinopril, work on lifestyle modification, continue close monitoring and adjust treatment plan as indicated.    We reviewed risks/benefits and potential side effects of various treatment options for dyslipidemia, elevated LDL and ASCVD risk score of 11.9%.  He voices understanding and declines statin therapy at this time. Pt advised to eat a heart healthy diet and get regular aerobic exercise.    BMI is >= 30 and <= 34.9 (Class 1 obesity). The following options were offered after discussion: exercise counseling/recommendations and nutrition counseling/recommendations. Nutrition and activity goals reviewed including: mainly water to drink, limit white flour/processed sugar, higher lean protein, high fiber carbs, regular meals, working toward 150 mins cardio per week.    Follow-up in 6 months, sooner as needed.  Patient was encouraged to keep me informed of any acute changes, lack of improvement, or any new concerning symptoms.  Pt is aware of reasons to seek emergent care.  Patient voiced understanding of all instructions and denied further questions.    Please note that portions of this note may have been completed with a voice recognition program. Efforts were made to edit the dictations, but occasionally words are mistranscribed.

## 2022-05-15 DIAGNOSIS — F51.09 SITUATIONAL INSOMNIA: ICD-10-CM

## 2022-05-16 RX ORDER — ZOLPIDEM TARTRATE 10 MG/1
5-10 TABLET ORAL
Qty: 30 TABLET | Refills: 5 | Status: SHIPPED | OUTPATIENT
Start: 2022-05-16 | End: 2022-11-21 | Stop reason: SDUPTHER

## 2022-05-16 NOTE — TELEPHONE ENCOUNTER
Rx Refill Note  Requested Prescriptions     Pending Prescriptions Disp Refills   • zolpidem (AMBIEN) 10 MG tablet 30 tablet 1     Sig: TAKE 1/2 TO 1 TABLET BY MOUTH NIGHTLY AS NEEDED FOR SLEEP      Last office visit with prescribing clinician: 5/2/2022      Next office visit with prescribing clinician: 11/2/2022            Linda Maier MA  05/16/22, 09:18 EDT

## 2022-08-16 ENCOUNTER — OFFICE VISIT (OUTPATIENT)
Dept: CARDIOLOGY | Facility: CLINIC | Age: 57
End: 2022-08-16

## 2022-08-16 VITALS
OXYGEN SATURATION: 99 % | DIASTOLIC BLOOD PRESSURE: 80 MMHG | HEART RATE: 83 BPM | SYSTOLIC BLOOD PRESSURE: 130 MMHG | WEIGHT: 225 LBS | HEIGHT: 70 IN | BODY MASS INDEX: 32.21 KG/M2

## 2022-08-16 DIAGNOSIS — I49.5 SINUS NODE DYSFUNCTION: Primary | ICD-10-CM

## 2022-08-16 DIAGNOSIS — Z95.0 PACEMAKER: ICD-10-CM

## 2022-08-16 DIAGNOSIS — R06.09 DYSPNEA ON EXERTION: ICD-10-CM

## 2022-08-16 DIAGNOSIS — R06.09 DYSPNEA ON EXERTION: Primary | ICD-10-CM

## 2022-08-16 PROCEDURE — 99213 OFFICE O/P EST LOW 20 MIN: CPT | Performed by: STUDENT IN AN ORGANIZED HEALTH CARE EDUCATION/TRAINING PROGRAM

## 2022-08-16 PROCEDURE — 93280 PM DEVICE PROGR EVAL DUAL: CPT | Performed by: STUDENT IN AN ORGANIZED HEALTH CARE EDUCATION/TRAINING PROGRAM

## 2022-08-16 NOTE — PROGRESS NOTES
Cardiac Electrophysiology Outpatient Note  [facility] at [facility]    Office Visit     Tom Harvey  [id number]  08/16/2022    Primary Care Physician: Meghan Burrell MD    Referred By: No ref. provider found    Subjective     Chief Complaint   Patient presents with   • Syncope and collapse       History of Present Illness:   Tom Harvey is a 57 y.o. male who presents to my electrophysiology clinic for follow up of severe sinus node dysfunction resulting in syncopal episode status post dual-chamber pacemaker implantation.  He was last seen approximately 1 year ago.  He is overall done well but does note some more dyspnea on exertion over the past year.  Most of the time he can keep walking but he does notice some increase in dyspnea.  He will try to regulate his breathing and slow down a little bit and will usually get better.  He denies any chest pain, orthopnea, PND, or lower extremity swelling.  Is not of any palpitations.  No problems at his pacemaker site.  He is taking his medications without issue.  Blood pressure at home seems to mostly be well controlled..      Past Medical History:   Diagnosis Date   • Arrhythmia    • Asthma    • Asystole by electrocardiogram (HCC) 08/19/1999    PAC   • Hypertension    • Hypogonadism in male 06/17/2018   • Irritable bowel syndrome with diarrhea 05/15/2018   • Mitral valve prolapse    • Osteoarthritis of spine with radiculopathy, cervical region 12/21/2018   • Pacemaker    • Polyarthralgia 05/15/2018   • Syncope and collapse 03/11/2017   • Vitamin B 12 deficiency 06/17/2018   • Wears glasses        Past Surgical History:   Procedure Laterality Date   • CARDIAC ELECTROPHYSIOLOGY PROCEDURE N/A 03/13/2017    Procedure: Pacemaker DC new;  Surgeon: Be Barron MD;  Location: [facility] INVASIVE LOCATION;  Service:    • COLONOSCOPY N/A 07/06/2018    Procedure: COLONOSCOPY;  Surgeon: Hong Silverio MD;  Location: [facility]  "ENDOSCOPY;  Service: Gastroenterology   • INSERT / REPLACE / REMOVE PACEMAKER     • KNEE ARTHROPLASTY UNICOMPARTMENTAL BILATERAL     • PACEMAKER IMPLANTATION         Family History   Problem Relation Age of Onset   • Stroke Mother 43   • Heart disease Father    • Dysphagia Father        Social History     Socioeconomic History   • Marital status:    Tobacco Use   • Smoking status: Never Smoker   • Smokeless tobacco: Never Used   Vaping Use   • Vaping Use: Never used   Substance and Sexual Activity   • Alcohol use: No   • Drug use: No   • Sexual activity: Not Currently     Partners: Male         Current Outpatient Medications:   •  citalopram (CeleXA) 10 MG tablet, Take 1 tablet by mouth Daily., Disp: 90 tablet, Rfl: 3  •  fluticasone (FLONASE) 50 MCG/ACT nasal spray, Instill 2 sprays in Each Nostril Once Daily., Disp: 16 g, Rfl: 11  •  lisinopril (PRINIVIL,ZESTRIL) 30 MG tablet, Take 1 tablet by mouth Daily., Disp: 90 tablet, Rfl: 3  •  meloxicam (MOBIC) 15 MG tablet, Take 1 tablet by mouth Daily As Needed., Disp: 90 tablet, Rfl: 4  •  zolpidem (AMBIEN) 10 MG tablet, Take 0.5-1 tablet by mouth every night at bedtime as needed for sleep, Disp: 30 tablet, Rfl: 5    Allergies:   Allergies   Allergen Reactions   • Levaquin [Levofloxacin] Hives       Objective   Vital Signs: Blood pressure 130/80, pulse 83, height 177.8 cm (70\"), weight 102 kg (225 lb), SpO2 99 %.    PHYSICAL EXAM  General appearance: Awake, alert, cooperative  Head: Normocephalic, without obvious abnormality, atraumatic  Neck: No JVD  Lungs: Clear to ascultation bilaterally  Heart: Regular rate and rhythm, no murmurs, 2+ LE pulses, no lower extremity swelling  Skin: Skin color, turgor normal, no rashes or lesions  Neurologic: Grossly normal     Lab Results   Component Value Date    GLUCOSE 92 03/02/2022    CALCIUM 9.8 03/02/2022     03/02/2022    K 4.1 03/02/2022    CO2 20.8 (L) 03/02/2022     (H) 03/02/2022    BUN 16 03/02/2022    " CREATININE 1.20 03/02/2022    EGFRIFAFRI 95 05/15/2018    EGFRIFNONA 66 10/23/2020    BCR 13.3 03/02/2022    ANIONGAP 14.2 03/02/2022     Lab Results   Component Value Date    WBC 9.05 03/02/2022    HGB 14.9 03/02/2022    HCT 45.2 03/02/2022    MCV 91.3 03/02/2022     03/02/2022     Lab Results   Component Value Date    INR 0.98 03/11/2017    PROTIME 10.7 03/11/2017     Lab Results   Component Value Date    TSH 1.690 03/02/2022          Results for orders placed during the hospital encounter of 03/11/17    Echocardiogram 2D complete    Interpretation Summary  · Left ventricular function is hyperdynamic (EF > 70).  · The cardiac valves are anatomically and functionally normal.         Tom Harvey  reports that he has never smoked. He has never used smokeless tobacco..     Assessment & Plan    1. Sinus node dysfunction (HCC)  He has history of severe sinus node dysfunction status post syncopal episodes.  This is not treated with his pacemaker as below.    2. Pacemaker  His Biotronik dual-chamber pacemaker is interrogated is functioning well.  He is approximately 6 years of battery life remaining.  He is pacing 77% of time in the atrium and none of the time in the ventricle.  His pacing and sensing thresholds all within normal limits.  No significant events detected.    3. Dyspnea on exertion  He does have new onset of dyspnea on exertion over the past year or so.  He does recall being told he had mitral valve prolapse in the past.  We will therefore get a new echo to rule out any structural heart issues as a cause of his dyspnea.  If that is normal we discussed the possibility of proceeding with stress testing and/or pulmonary function testing.       Follow Up:  Return in about 1 year (around 8/16/2023).      Thank you for allowing me to participate in the care of your patient. Please do not hesitate to contact me with additional questions or concerns.      Ronny Hinds M.D.  Cardiac  Electrophysiologist  [facility] / [facility]

## 2022-08-18 ENCOUNTER — HOSPITAL ENCOUNTER (OUTPATIENT)
Dept: CARDIOLOGY | Facility: HOSPITAL | Age: 57
Discharge: HOME | End: 2022-08-18
Admitting: STUDENT IN AN ORGANIZED HEALTH CARE EDUCATION/TRAINING PROGRAM

## 2022-08-18 VITALS — HEIGHT: 70 IN | WEIGHT: 225 LBS | BODY MASS INDEX: 32.21 KG/M2

## 2022-08-18 DIAGNOSIS — R06.09 DYSPNEA ON EXERTION: ICD-10-CM

## 2022-08-18 LAB
BH CV ECHO MEAS - AO MAX PG: 11.6 MMHG
BH CV ECHO MEAS - AO MEAN PG: 6 MMHG
BH CV ECHO MEAS - AO ROOT DIAM: 2.7 CM
BH CV ECHO MEAS - AO V2 MAX: 170 CM/SEC
BH CV ECHO MEAS - AO V2 VTI: 32 CM
BH CV ECHO MEAS - AVA(I,D): 2.5 CM2
BH CV ECHO MEAS - EDV(CUBED): 117.6 ML
BH CV ECHO MEAS - EDV(MOD-SP2): 61.3 ML
BH CV ECHO MEAS - EDV(MOD-SP4): 89.7 ML
BH CV ECHO MEAS - EF(MOD-BP): 56.6 %
BH CV ECHO MEAS - EF(MOD-SP2): 60.8 %
BH CV ECHO MEAS - EF(MOD-SP4): 55.4 %
BH CV ECHO MEAS - ESV(CUBED): 29.2 ML
BH CV ECHO MEAS - ESV(MOD-SP2): 24 ML
BH CV ECHO MEAS - ESV(MOD-SP4): 40 ML
BH CV ECHO MEAS - FS: 37.1 %
BH CV ECHO MEAS - IVS/LVPW: 0.83 CM
BH CV ECHO MEAS - IVSD: 0.96 CM
BH CV ECHO MEAS - LA DIMENSION: 3.5 CM
BH CV ECHO MEAS - LAT PEAK E' VEL: 7.1 CM/SEC
BH CV ECHO MEAS - LV MASS(C)D: 189.3 GRAMS
BH CV ECHO MEAS - LV MAX PG: 8.1 MMHG
BH CV ECHO MEAS - LV MEAN PG: 4 MMHG
BH CV ECHO MEAS - LV V1 MAX: 142 CM/SEC
BH CV ECHO MEAS - LV V1 VTI: 26.5 CM
BH CV ECHO MEAS - LVIDD: 4.9 CM
BH CV ECHO MEAS - LVIDS: 3.1 CM
BH CV ECHO MEAS - LVOT AREA: 3.1 CM2
BH CV ECHO MEAS - LVOT DIAM: 1.98 CM
BH CV ECHO MEAS - LVPWD: 1 CM
BH CV ECHO MEAS - MED PEAK E' VEL: 8.1 CM/SEC
BH CV ECHO MEAS - MV A MAX VEL: 80 CM/SEC
BH CV ECHO MEAS - MV DEC SLOPE: 445.5 CM/SEC2
BH CV ECHO MEAS - MV DEC TIME: 0.15 MSEC
BH CV ECHO MEAS - MV E MAX VEL: 80.5 CM/SEC
BH CV ECHO MEAS - MV E/A: 1.01
BH CV ECHO MEAS - MV MAX PG: 4.8 MMHG
BH CV ECHO MEAS - MV MEAN PG: 3 MMHG
BH CV ECHO MEAS - MV P1/2T: 72.3 MSEC
BH CV ECHO MEAS - MV V2 VTI: 31.8 CM
BH CV ECHO MEAS - MVA(P1/2T): 3 CM2
BH CV ECHO MEAS - MVA(VTI): 2.6 CM2
BH CV ECHO MEAS - PA ACC TIME: 0.11 SEC
BH CV ECHO MEAS - PA PR(ACCEL): 30 MMHG
BH CV ECHO MEAS - PA V2 MAX: 110 CM/SEC
BH CV ECHO MEAS - PI END-D VEL: 107 CM/SEC
BH CV ECHO MEAS - RAP SYSTOLE: 3 MMHG
BH CV ECHO MEAS - RV MAX PG: 3.1 MMHG
BH CV ECHO MEAS - RV V1 MAX: 87.9 CM/SEC
BH CV ECHO MEAS - RV V1 VTI: 19.9 CM
BH CV ECHO MEAS - SV(LVOT): 81.6 ML
BH CV ECHO MEAS - SV(MOD-SP2): 37.3 ML
BH CV ECHO MEAS - SV(MOD-SP4): 49.7 ML
BH CV ECHO MEAS - TAPSE (>1.6): 2.04 CM
BH CV ECHO MEASUREMENTS AVERAGE E/E' RATIO: 10.59
BH CV XLRA - RV BASE: 3.9 CM
BH CV XLRA - RV LENGTH: 6.5 CM
BH CV XLRA - RV MID: 2.9 CM
BH CV XLRA - TDI S': 14 CM/SEC
LEFT ATRIUM VOLUME INDEX: 13.3 ML/M2
LV EF 2D ECHO EST: 58 %
MAXIMAL PREDICTED HEART RATE: 163 BPM
STRESS TARGET HR: 139 BPM

## 2022-08-18 PROCEDURE — 93306 TTE W/DOPPLER COMPLETE: CPT

## 2022-08-18 PROCEDURE — 93306 TTE W/DOPPLER COMPLETE: CPT | Performed by: INTERNAL MEDICINE

## 2022-11-21 DIAGNOSIS — F51.09 SITUATIONAL INSOMNIA: ICD-10-CM

## 2022-11-21 RX ORDER — ZOLPIDEM TARTRATE 10 MG/1
5-10 TABLET ORAL
Qty: 30 TABLET | Refills: 0 | Status: SHIPPED | OUTPATIENT
Start: 2022-11-21 | End: 2023-01-18 | Stop reason: SDUPTHER

## 2022-11-21 NOTE — TELEPHONE ENCOUNTER
ROSALINA reviewed. Refill approved. Medication E rx'd to pharmacy as requested. Pt to keep f/u apt as scheduled.     never used

## 2022-11-28 ENCOUNTER — OFFICE VISIT (OUTPATIENT)
Dept: FAMILY MEDICINE CLINIC | Facility: CLINIC | Age: 57
End: 2022-11-28

## 2022-11-28 VITALS
DIASTOLIC BLOOD PRESSURE: 90 MMHG | HEIGHT: 70 IN | WEIGHT: 225 LBS | TEMPERATURE: 98.1 F | OXYGEN SATURATION: 97 % | BODY MASS INDEX: 32.21 KG/M2 | SYSTOLIC BLOOD PRESSURE: 148 MMHG | HEART RATE: 73 BPM

## 2022-11-28 DIAGNOSIS — E66.09 CLASS 1 OBESITY DUE TO EXCESS CALORIES WITH SERIOUS COMORBIDITY AND BODY MASS INDEX (BMI) OF 32.0 TO 32.9 IN ADULT: ICD-10-CM

## 2022-11-28 DIAGNOSIS — F51.09 SITUATIONAL INSOMNIA: ICD-10-CM

## 2022-11-28 DIAGNOSIS — E78.5 DYSLIPIDEMIA: ICD-10-CM

## 2022-11-28 DIAGNOSIS — F41.1 GAD (GENERALIZED ANXIETY DISORDER): ICD-10-CM

## 2022-11-28 DIAGNOSIS — R73.03 PREDIABETES: ICD-10-CM

## 2022-11-28 DIAGNOSIS — I10 ESSENTIAL HYPERTENSION: Primary | ICD-10-CM

## 2022-11-28 PROCEDURE — 99214 OFFICE O/P EST MOD 30 MIN: CPT | Performed by: FAMILY MEDICINE

## 2022-11-28 RX ORDER — LISINOPRIL 30 MG/1
30 TABLET ORAL DAILY
Qty: 90 TABLET | Refills: 3 | Status: SHIPPED | OUTPATIENT
Start: 2022-11-28

## 2022-11-28 RX ORDER — MELOXICAM 15 MG/1
15 TABLET ORAL DAILY PRN
Qty: 90 TABLET | Refills: 3 | Status: SHIPPED | OUTPATIENT
Start: 2022-11-28

## 2022-11-28 RX ORDER — CITALOPRAM 10 MG/1
10 TABLET ORAL DAILY
Qty: 90 TABLET | Refills: 3 | Status: SHIPPED | OUTPATIENT
Start: 2022-11-28

## 2022-12-02 PROBLEM — R73.03 PREDIABETES: Status: ACTIVE | Noted: 2022-12-02

## 2022-12-27 PROCEDURE — 93294 REM INTERROG EVL PM/LDLS PM: CPT | Performed by: STUDENT IN AN ORGANIZED HEALTH CARE EDUCATION/TRAINING PROGRAM

## 2022-12-27 PROCEDURE — 93296 REM INTERROG EVL PM/IDS: CPT | Performed by: STUDENT IN AN ORGANIZED HEALTH CARE EDUCATION/TRAINING PROGRAM

## 2023-01-18 DIAGNOSIS — F51.09 SITUATIONAL INSOMNIA: ICD-10-CM

## 2023-01-20 RX ORDER — ZOLPIDEM TARTRATE 10 MG/1
5-10 TABLET ORAL
Qty: 30 TABLET | Refills: 5 | Status: SHIPPED | OUTPATIENT
Start: 2023-01-20

## 2023-01-23 RX ORDER — FLUTICASONE PROPIONATE 50 MCG
2 SPRAY, SUSPENSION (ML) NASAL DAILY
Qty: 16 G | Refills: 11 | Status: SHIPPED | OUTPATIENT
Start: 2023-01-23

## 2023-07-25 PROBLEM — F41.1 GAD (GENERALIZED ANXIETY DISORDER): Status: RESOLVED | Noted: 2022-03-01 | Resolved: 2023-07-25

## 2023-07-28 DIAGNOSIS — M25.562 ARTHRALGIA OF LEFT KNEE: Primary | ICD-10-CM

## 2023-07-31 ENCOUNTER — TELEPHONE (OUTPATIENT)
Dept: FAMILY MEDICINE CLINIC | Facility: CLINIC | Age: 58
End: 2023-07-31

## 2023-07-31 ENCOUNTER — OFFICE VISIT (OUTPATIENT)
Dept: ORTHOPEDIC SURGERY | Facility: CLINIC | Age: 58
End: 2023-07-31
Payer: COMMERCIAL

## 2023-07-31 VITALS
TEMPERATURE: 97.7 F | DIASTOLIC BLOOD PRESSURE: 70 MMHG | WEIGHT: 225 LBS | SYSTOLIC BLOOD PRESSURE: 130 MMHG | HEIGHT: 70 IN | BODY MASS INDEX: 32.21 KG/M2

## 2023-07-31 DIAGNOSIS — M17.0 PRIMARY OSTEOARTHRITIS OF BOTH KNEES: ICD-10-CM

## 2023-07-31 DIAGNOSIS — M25.562 ARTHRALGIA OF LEFT KNEE: Primary | ICD-10-CM

## 2023-07-31 DIAGNOSIS — F51.09 SITUATIONAL INSOMNIA: ICD-10-CM

## 2023-07-31 RX ORDER — ZOLPIDEM TARTRATE 10 MG/1
5-10 TABLET ORAL
Qty: 30 TABLET | Refills: 2 | Status: SHIPPED | OUTPATIENT
Start: 2023-07-31

## 2023-07-31 RX ORDER — LIDOCAINE HYDROCHLORIDE 10 MG/ML
2 INJECTION, SOLUTION INFILTRATION; PERINEURAL
Status: COMPLETED | OUTPATIENT
Start: 2023-07-31 | End: 2023-07-31

## 2023-07-31 RX ORDER — METHYLPREDNISOLONE ACETATE 40 MG/ML
40 INJECTION, SUSPENSION INTRA-ARTICULAR; INTRALESIONAL; INTRAMUSCULAR; SOFT TISSUE
Status: COMPLETED | OUTPATIENT
Start: 2023-07-31 | End: 2023-07-31

## 2023-07-31 RX ADMIN — LIDOCAINE HYDROCHLORIDE 2 ML: 10 INJECTION, SOLUTION INFILTRATION; PERINEURAL at 14:42

## 2023-07-31 RX ADMIN — METHYLPREDNISOLONE ACETATE 40 MG: 40 INJECTION, SUSPENSION INTRA-ARTICULAR; INTRALESIONAL; INTRAMUSCULAR; SOFT TISSUE at 14:42

## 2023-07-31 NOTE — TELEPHONE ENCOUNTER
ROSALINA reviewed. Refill approved. Medication E rx'd to pharmacy as requested.     Pt last seen 11/2022. I believe he was supposed to schedule a follow up for a BP recheck. See if he is willing to schedule a routine physical before end of year.

## 2023-08-01 ENCOUNTER — PATIENT ROUNDING (BHMG ONLY) (OUTPATIENT)
Dept: ORTHOPEDIC SURGERY | Facility: CLINIC | Age: 58
End: 2023-08-01
Payer: COMMERCIAL

## 2023-08-22 ENCOUNTER — OFFICE VISIT (OUTPATIENT)
Dept: CARDIOLOGY | Facility: CLINIC | Age: 58
End: 2023-08-22
Payer: COMMERCIAL

## 2023-08-22 VITALS
DIASTOLIC BLOOD PRESSURE: 84 MMHG | RESPIRATION RATE: 18 BRPM | WEIGHT: 223 LBS | SYSTOLIC BLOOD PRESSURE: 124 MMHG | OXYGEN SATURATION: 96 % | BODY MASS INDEX: 31.92 KG/M2 | HEART RATE: 89 BPM | HEIGHT: 70 IN

## 2023-08-22 DIAGNOSIS — I49.5 SINUS NODE DYSFUNCTION: Primary | ICD-10-CM

## 2023-08-22 DIAGNOSIS — Z95.0 S/P PLACEMENT OF CARDIAC PACEMAKER: ICD-10-CM

## 2023-08-22 NOTE — PROGRESS NOTES
Cardiac Electrophysiology Outpatient Note  [facility] at [facility]    Office Visit     Tom Harvey  [id number]  08/22/2023    Primary Care Physician: Meghan Burrell MD    Referred By: No ref. provider found    Subjective     Chief Complaint   Patient presents with    Follow-up     Sinus node dysfunction       History of Present Illness:   Tom Harvey is a 58 y.o. male who presents to my electrophysiology clinic for follow up of sick sinus syndrome s/p DC PPM.  He reports he is doing very well from a cardiovascular standpoint.  He notes some dyspnea on exertion that is appropriate with his activity and likely related to some deconditioning.  Follow-up echocardiogram to investigate these symptoms was unremarkable last fall  with normal LV systolic, LV diastolic and valvular function. He denies any other complaints and is happy with his pacemaker.      Past Medical History:   Diagnosis Date    Arrhythmia     Asthma     Asystole by electrocardiogram 08/19/1999    PAC    Hypertension     Hypogonadism in male 06/17/2018    Irritable bowel syndrome with diarrhea 05/15/2018    Mitral valve prolapse     Osteoarthritis of spine with radiculopathy, cervical region 12/21/2018    Pacemaker     Polyarthralgia 05/15/2018    Syncope and collapse 03/11/2017    Vitamin B 12 deficiency 06/17/2018    Wears glasses        Past Surgical History:   Procedure Laterality Date    CARDIAC ELECTROPHYSIOLOGY PROCEDURE N/A 03/13/2017    Procedure: Pacemaker DC new;  Surgeon: Be Barron MD;  [facility];  Service:     COLONOSCOPY N/A 07/06/2018    Procedure: COLONOSCOPY;  Surgeon: Hong Silverio MD;  Location: [facility] ENDOSCOPY;  Service: Gastroenterology    INSERT / REPLACE / REMOVE PACEMAKER      KNEE ARTHROSCOPY Left     Dr. Chaparro performed 15 - 16 years ago.    KNEE ARTHROSCOPY Right 2019    Dr. Claros    PACEMAKER IMPLANTATION         Family History  "  Problem Relation Age of Onset    Stroke Mother 43    Heart disease Father     Dysphagia Father        Social History     Socioeconomic History    Marital status:    Tobacco Use    Smoking status: Never     Passive exposure: Past    Smokeless tobacco: Never   Vaping Use    Vaping Use: Never used   Substance and Sexual Activity    Alcohol use: Not Currently    Drug use: Never    Sexual activity: Not Currently     Partners: Male         Current Outpatient Medications:     citalopram (CeleXA) 10 MG tablet, Take 1 tablet by mouth Daily., Disp: 90 tablet, Rfl: 3    fluticasone (FLONASE) 50 MCG/ACT nasal spray, Instill 2 sprays in Each Nostril Once Daily., Disp: 16 g, Rfl: 11    lisinopril (PRINIVIL,ZESTRIL) 30 MG tablet, Take 1 tablet by mouth Daily., Disp: 90 tablet, Rfl: 3    meloxicam (MOBIC) 15 MG tablet, Take 1 tablet by mouth Daily As Needed (pain)., Disp: 90 tablet, Rfl: 3    zolpidem (AMBIEN) 10 MG tablet, Take 1/2 - 1 tablet by mouth every night at bedtime as needed for sleep, Disp: 30 tablet, Rfl: 2    Allergies:   Allergies   Allergen Reactions    Levaquin [Levofloxacin] Hives       Objective   Vital Signs: Blood pressure 124/84, pulse 89, resp. rate 18, height 177.8 cm (70\"), weight 101 kg (223 lb), SpO2 96 %.    PHYSICAL EXAM  General appearance: Awake, alert, cooperative  Lungs: Clear to ascultation bilaterally  Heart: Regular rate and rhythm, no murmurs, 2+ LE pulses, no lower extremity swelling  Skin: Skin color, turgor normal, no rashes or lesions  Neurologic: Grossly normal     Lab Results   Component Value Date    GLUCOSE 92 03/02/2022    CALCIUM 9.8 03/02/2022     03/02/2022    K 4.1 03/02/2022    CO2 20.8 (L) 03/02/2022     (H) 03/02/2022    BUN 16 03/02/2022    CREATININE 1.20 03/02/2022    EGFRIFAFRI 95 05/15/2018    EGFRIFNONA 66 10/23/2020    BCR 13.3 03/02/2022    ANIONGAP 14.2 03/02/2022     Lab Results   Component Value Date    WBC 9.05 03/02/2022    HGB 14.9 03/02/2022    " HCT 45.2 03/02/2022    MCV 91.3 03/02/2022     03/02/2022     Lab Results   Component Value Date    INR 0.98 03/11/2017    PROTIME 10.7 03/11/2017     Lab Results   Component Value Date    TSH 1.690 03/02/2022          Results for orders placed during the hospital encounter of 08/18/22    Adult Transthoracic Echo Complete W/ Cont if Necessary Per Protocol    Interpretation Summary  ú Estimated left ventricular EF = 58% Left ventricular ejection fraction appears to be 56 - 60%. Left ventricular systolic function is normal.  ú Left ventricular diastolic function was normal.         I personally viewed and interpreted the patient's EKG/Telemetry/lab data      ECG 12 Lead    Date/Time: 8/22/2023 10:17 PM  Performed by: Car Ricci PA-C  Authorized by: Car Ricci PA-C   Comparison: compared with previous ECG from 2/12/2018  Comparison to previous ECG: Nonspec IV block and short AK no longer present   Rhythm: sinus rhythm  Rate: normal  BPM: 72  Other findings: non-specific ST-T wave changes    Clinical impression: non-specific ECG        Tom Harvey  reports that he has never smoked. He has been exposed to tobacco smoke. He has never used smokeless tobacco.. I have educated him on the risk of diseases from using tobacco products such as cancer, COPD, and heart disease.        Advance Care Planning   Advance Care Planning: ACP discussion was declined by the patient. Patient does not have an advance directive, declines further assistance.     Assessment & Plan    1. Sinus node dysfunction  S/p DC PPM. Patient notes good functional status in general and denies any lightheadedness, syncope or chest discomfort. He notes some dyspnea on exertion that is mild and likely due to deconditioning. Echo acquired last year normal LVSF, LVDF and no VHD.    2. S/P placement of cardiac pacemaker  Device interrogation acceptable in office today detailed below:    BTK manual interrogation: DC PPM, DDDR, DDD-CLS,  rate 70, 79% Ap, 0% , normal threshold and impedance values, some sinus events just over 160 bpm in absence of concerning symptmoms likely benign, MS raised to 170       Follow Up:  Return in about 1 year (around 8/22/2024).      Thank you for allowing me to participate in the care of your patient. Please do not hesitate to contact me with additional questions or concerns.        Electronically signed by Car Ricci PA-C, 08/22/23, 10:24 PM EDT.

## 2023-09-13 NOTE — TELEPHONE ENCOUNTER
Rx Refill Note  Requested Prescriptions     Pending Prescriptions Disp Refills    zolpidem (AMBIEN) 10 MG tablet 30 tablet 5     Sig: Take 1/2 - 1 tablet by mouth every night at bedtime as needed for sleep      Last office visit with prescribing clinician: 11/28/2022   Last telemedicine visit with prescribing clinician: Visit date not found   Next office visit with prescribing clinician: Visit date not found                         Would you like a call back once the refill request has been completed: [] Yes [] No    If the office needs to give you a call back, can they leave a voicemail: [] Yes [] No    Shama Mathis MA  07/31/23, 11:41 EDT   Stable. I will put out refills of sildenafil.

## 2023-12-26 DIAGNOSIS — F51.09 SITUATIONAL INSOMNIA: ICD-10-CM

## 2023-12-26 RX ORDER — FLUTICASONE PROPIONATE 50 MCG
2 SPRAY, SUSPENSION (ML) NASAL DAILY
Qty: 16 G | Refills: 11 | OUTPATIENT
Start: 2023-12-26

## 2023-12-28 RX ORDER — ZOLPIDEM TARTRATE 10 MG/1
5-10 TABLET ORAL
Qty: 30 TABLET | Refills: 2 | OUTPATIENT
Start: 2023-12-28

## 2024-01-19 RX ORDER — FLUTICASONE PROPIONATE 50 MCG
2 SPRAY, SUSPENSION (ML) NASAL DAILY
Qty: 16 G | Refills: 11 | OUTPATIENT
Start: 2024-01-19

## 2024-01-31 ENCOUNTER — OFFICE VISIT (OUTPATIENT)
Dept: FAMILY MEDICINE CLINIC | Facility: CLINIC | Age: 59
End: 2024-01-31
Payer: COMMERCIAL

## 2024-01-31 VITALS
OXYGEN SATURATION: 98 % | HEIGHT: 70 IN | BODY MASS INDEX: 32.78 KG/M2 | TEMPERATURE: 97.6 F | SYSTOLIC BLOOD PRESSURE: 132 MMHG | WEIGHT: 229 LBS | HEART RATE: 81 BPM | DIASTOLIC BLOOD PRESSURE: 86 MMHG

## 2024-01-31 DIAGNOSIS — Z23 NEED FOR TDAP VACCINATION: ICD-10-CM

## 2024-01-31 DIAGNOSIS — Z28.21 INFLUENZA VACCINATION DECLINED: ICD-10-CM

## 2024-01-31 DIAGNOSIS — Z12.5 SCREENING FOR PROSTATE CANCER: ICD-10-CM

## 2024-01-31 DIAGNOSIS — Z00.00 WELL ADULT EXAM: Primary | ICD-10-CM

## 2024-01-31 DIAGNOSIS — F51.09 SITUATIONAL INSOMNIA: ICD-10-CM

## 2024-01-31 RX ORDER — CITALOPRAM HYDROBROMIDE 10 MG/1
1 TABLET ORAL DAILY
COMMUNITY

## 2024-01-31 RX ORDER — ZOLPIDEM TARTRATE 10 MG/1
5-10 TABLET ORAL
Qty: 30 TABLET | Refills: 2 | Status: SHIPPED | OUTPATIENT
Start: 2024-01-31

## 2024-01-31 RX ORDER — LISINOPRIL 20 MG/1
1 TABLET ORAL DAILY
COMMUNITY
End: 2024-01-31 | Stop reason: SDUPTHER

## 2024-01-31 NOTE — PROGRESS NOTES
Subjective   Tom Harvey is a 58 y.o. male.     History of Present Illness  Here for annual check up.    , works full time at [employer]. Is a non-smoker. Denies EtOH use, no ilicit substance use.     Not getting regular exercise. Varied diet but excess calories. Follows standard safety precautions. Denies concern regarding STD exposure or need for screening.    Followed by cardiology for pacemaker mgnt. Denies symptoms.    On ace-inh for HTN. Some intermittent dry cough, does not associate it with his med.    On ambien as needed for insomnia. Working well. Denies side effects.    Family hx include mother with CVA in her 40s. No premature family h/o colon CA, CAD. No family prostate CA.    UTD on dental and vision check ups.    The following portions of the patient's history were reviewed and updated as appropriate: allergies, current medications, past family history, past medical history, past social history, past surgical history, and problem list.    Review of Systems   Constitutional:  Positive for fatigue. Negative for fever.   HENT:  Negative for mouth sores, nosebleeds, sore throat and trouble swallowing.    Eyes:  Negative for visual disturbance.   Respiratory:  Positive for cough. Negative for shortness of breath.    Cardiovascular:  Negative for chest pain, palpitations and leg swelling.   Gastrointestinal:  Negative for abdominal pain, blood in stool, diarrhea, nausea and vomiting.   Genitourinary:  Negative for dysuria and hematuria.   Skin:  Negative for rash.   Neurological:  Negative for seizures, syncope, facial asymmetry, speech difficulty, weakness and headaches.   Hematological:  Negative for adenopathy. Does not bruise/bleed easily.   Psychiatric/Behavioral:  Positive for dysphoric mood (mildly) and sleep disturbance. Negative for confusion and suicidal ideas. The patient is nervous/anxious (mildly).    Pt's previous ROS reviewed and updated as indicated.   f    Objective   Vitals:     01/31/24 1419   BP: 132/86   Pulse: 81   Temp: 97.6 °F (36.4 °C)   SpO2: 98%     Body mass index is 32.86 kg/m².      01/31/24  1419   Weight: 104 kg (229 lb)       Physical Exam  Vitals and nursing note reviewed.   Constitutional:       General: He is not in acute distress.     Appearance: He is well-developed and well-groomed. He is obese. He is not ill-appearing.   HENT:      Head: Normocephalic and atraumatic.      Right Ear: Tympanic membrane, ear canal and external ear normal.      Left Ear: Tympanic membrane, ear canal and external ear normal.   Eyes:      General: No scleral icterus.     Conjunctiva/sclera: Conjunctivae normal.   Neck:      Thyroid: No thyroid mass.      Vascular: Normal carotid pulses. No carotid bruit.   Cardiovascular:      Rate and Rhythm: Normal rate and regular rhythm.      Pulses: Normal pulses.      Heart sounds: Normal heart sounds.   Pulmonary:      Effort: Pulmonary effort is normal.      Breath sounds: Normal breath sounds.   Abdominal:      General: Bowel sounds are normal. There is no distension.      Palpations: Abdomen is soft. There is no hepatomegaly, splenomegaly or mass.      Tenderness: There is no abdominal tenderness.   Musculoskeletal:      Right lower leg: No edema.      Left lower leg: No edema.   Lymphadenopathy:      Cervical: No cervical adenopathy.   Skin:     General: Skin is warm and dry.      Coloration: Skin is not jaundiced or pale.      Findings: No bruising or rash.   Neurological:      Mental Status: He is alert and oriented to person, place, and time.      Motor: No tremor.      Gait: Gait is intact.   Psychiatric:         Mood and Affect: Mood normal.         Speech: Speech normal.         Behavior: Behavior normal. Behavior is cooperative.         Thought Content: Thought content normal.         Cognition and Memory: Cognition and memory normal.         Judgment: Judgment normal.         Assessment & Plan   Diagnoses and all orders for this  visit:    1. Well adult exam (Primary)  -     CBC & Differential  -     Comprehensive Metabolic Panel  -     Lipid Panel  -     TSH Rfx On Abnormal To Free T4  -     Hemoglobin A1c  -     PSA Screen    2. Screening for prostate cancer  -     PSA Screen    3. Situational insomnia  -     zolpidem (AMBIEN) 10 MG tablet; Take 1/2 - 1 tablet by mouth every night at bedtime as needed for sleep  Dispense: 30 tablet; Refill: 2    4. Influenza vaccination declined    5. Need for Tdap vaccination    Other orders  -     Tdap Vaccine => 8yo IM (BOOSTRIX)       BMI is >= 30 and <35. (Class 1 Obesity). The following options were offered after discussion;: weight loss educational material (shared in after visit summary), exercise counseling/recommendations, and nutrition counseling/recommendations. Nutrition and activity goals reviewed including: mainly water to drink, limit white flour/processed sugar, higher lean protein, high fiber carbs, regular meals, working toward 150 mins cardio per week.    Age appropriate preventive care reviewed including cancer screenings, safety measures, mental health concerns, supplements, prevention of CV disease and DM, etc. Handout provided.    If he has persistent cough, recommend trial off ace-inh, replace for ARB.    Continue ambien as needed for sleep.  As part of patient's treatment plan I am prescribing a controlled substance.  The patient has been made aware of the appropriate use of such medications, including potential risk of somnolence, limited ability to drive and/or work safely, and potential for dependence and/or overdose.  It has also been made clear that these medications are for use by this patient only, without concomitant use of alcohol or other substances, unless prescribed.  History and physical exam exhibit continued safe and appropriate use of controlled substance.  ROSALINA reviewed.  Patient has completed a prescribing agreement detailing terms of continued prescribing of  controlled substances, including monitoring ROSALINA reports, urine drug screening, and pill counts if necessary.  Patient is aware that inappropriate use will result in cessation of prescribing such medications.    Routine f/u in 1 year for annual check up, f/u sooner as needed/instructed.  I will contact patient regarding test results and provide instructions regarding any necessary changes in plan of care.  Patient was encouraged to keep me informed of any acute changes, or any new concerning symptoms.  Pt is aware of reasons to seek emergent care.  Patient voiced understanding of all instructions and denied further questions.    Please note that portions of this note may have been completed with a voice recognition program.

## 2024-02-01 LAB
ALBUMIN SERPL-MCNC: 4.3 G/DL (ref 3.5–5.2)
ALBUMIN/GLOB SERPL: 2 G/DL
ALP SERPL-CCNC: 87 U/L (ref 39–117)
ALT SERPL-CCNC: 25 U/L (ref 1–41)
AST SERPL-CCNC: 16 U/L (ref 1–40)
BASOPHILS # BLD AUTO: 0.05 10*3/MM3 (ref 0–0.2)
BASOPHILS NFR BLD AUTO: 0.7 % (ref 0–1.5)
BILIRUB SERPL-MCNC: 0.3 MG/DL (ref 0–1.2)
BUN SERPL-MCNC: 16 MG/DL (ref 6–20)
BUN/CREAT SERPL: 14.2 (ref 7–25)
CALCIUM SERPL-MCNC: 9.7 MG/DL (ref 8.6–10.5)
CHLORIDE SERPL-SCNC: 106 MMOL/L (ref 98–107)
CHOLEST SERPL-MCNC: 207 MG/DL (ref 0–200)
CO2 SERPL-SCNC: 27.2 MMOL/L (ref 22–29)
CREAT SERPL-MCNC: 1.13 MG/DL (ref 0.76–1.27)
EGFRCR SERPLBLD CKD-EPI 2021: 75.3 ML/MIN/1.73
EOSINOPHIL # BLD AUTO: 0.24 10*3/MM3 (ref 0–0.4)
EOSINOPHIL NFR BLD AUTO: 3.4 % (ref 0.3–6.2)
ERYTHROCYTE [DISTWIDTH] IN BLOOD BY AUTOMATED COUNT: 12.1 % (ref 12.3–15.4)
GLOBULIN SER CALC-MCNC: 2.1 GM/DL
GLUCOSE SERPL-MCNC: 101 MG/DL (ref 65–99)
HBA1C MFR BLD: 6.1 % (ref 4.8–5.6)
HCT VFR BLD AUTO: 45.4 % (ref 37.5–51)
HDLC SERPL-MCNC: 32 MG/DL (ref 40–60)
HGB BLD-MCNC: 15.3 G/DL (ref 13–17.7)
IMM GRANULOCYTES # BLD AUTO: 0.02 10*3/MM3 (ref 0–0.05)
IMM GRANULOCYTES NFR BLD AUTO: 0.3 % (ref 0–0.5)
LDLC SERPL CALC-MCNC: 121 MG/DL (ref 0–100)
LYMPHOCYTES # BLD AUTO: 2.03 10*3/MM3 (ref 0.7–3.1)
LYMPHOCYTES NFR BLD AUTO: 28.7 % (ref 19.6–45.3)
MCH RBC QN AUTO: 29.8 PG (ref 26.6–33)
MCHC RBC AUTO-ENTMCNC: 33.7 G/DL (ref 31.5–35.7)
MCV RBC AUTO: 88.5 FL (ref 79–97)
MONOCYTES # BLD AUTO: 0.56 10*3/MM3 (ref 0.1–0.9)
MONOCYTES NFR BLD AUTO: 7.9 % (ref 5–12)
NEUTROPHILS # BLD AUTO: 4.17 10*3/MM3 (ref 1.7–7)
NEUTROPHILS NFR BLD AUTO: 59 % (ref 42.7–76)
NRBC BLD AUTO-RTO: 0 /100 WBC (ref 0–0.2)
PLATELET # BLD AUTO: 258 10*3/MM3 (ref 140–450)
POTASSIUM SERPL-SCNC: 4.3 MMOL/L (ref 3.5–5.2)
PROT SERPL-MCNC: 6.4 G/DL (ref 6–8.5)
PSA SERPL-MCNC: 1.41 NG/ML (ref 0–4)
RBC # BLD AUTO: 5.13 10*6/MM3 (ref 4.14–5.8)
SODIUM SERPL-SCNC: 142 MMOL/L (ref 136–145)
TRIGL SERPL-MCNC: 304 MG/DL (ref 0–150)
TSH SERPL DL<=0.005 MIU/L-ACNC: 1.52 UIU/ML (ref 0.27–4.2)
VLDLC SERPL CALC-MCNC: 54 MG/DL (ref 5–40)
WBC # BLD AUTO: 7.07 10*3/MM3 (ref 3.4–10.8)

## 2024-02-08 DIAGNOSIS — I10 ESSENTIAL HYPERTENSION: ICD-10-CM

## 2024-02-08 RX ORDER — LISINOPRIL 30 MG/1
30 TABLET ORAL DAILY
Qty: 90 TABLET | Refills: 3 | Status: SHIPPED | OUTPATIENT
Start: 2024-02-08

## 2024-02-16 RX ORDER — CITALOPRAM HYDROBROMIDE 10 MG/1
10 TABLET ORAL DAILY
Qty: 90 TABLET | Refills: 2 | Status: SHIPPED | OUTPATIENT
Start: 2024-02-16

## 2024-03-29 RX ORDER — FLUTICASONE PROPIONATE 50 MCG
2 SPRAY, SUSPENSION (ML) NASAL DAILY
Qty: 16 G | Refills: 11 | Status: SHIPPED | OUTPATIENT
Start: 2024-03-29

## 2024-05-17 DIAGNOSIS — F51.09 SITUATIONAL INSOMNIA: ICD-10-CM

## 2024-05-20 RX ORDER — ZOLPIDEM TARTRATE 10 MG/1
5-10 TABLET ORAL
Qty: 30 TABLET | Refills: 5 | Status: SHIPPED | OUTPATIENT
Start: 2024-05-20

## 2024-05-20 NOTE — TELEPHONE ENCOUNTER
Rx Refill Note  Requested Prescriptions     Pending Prescriptions Disp Refills    zolpidem (AMBIEN) 10 MG tablet 30 tablet 2     Sig: Take 1/2 - 1 tablet by mouth every night at bedtime as needed for sleep      Last office visit with prescribing clinician: 1/31/2024   Last telemedicine visit with prescribing clinician: Visit date not found   Next office visit with prescribing clinician: 2/3/2025                         Would you like a call back once the refill request has been completed: [] Yes [] No    If the office needs to give you a call back, can they leave a voicemail: [] Yes [] No    Linda Maier MA  05/20/24, 16:26 EDT

## 2024-08-27 ENCOUNTER — OFFICE VISIT (OUTPATIENT)
Dept: CARDIOLOGY | Facility: CLINIC | Age: 59
End: 2024-08-27
Payer: COMMERCIAL

## 2024-08-27 VITALS
SYSTOLIC BLOOD PRESSURE: 132 MMHG | HEART RATE: 87 BPM | OXYGEN SATURATION: 96 % | DIASTOLIC BLOOD PRESSURE: 90 MMHG | HEIGHT: 69 IN | BODY MASS INDEX: 34.36 KG/M2 | WEIGHT: 232 LBS

## 2024-08-27 DIAGNOSIS — I49.5 SINUS NODE DYSFUNCTION: Primary | ICD-10-CM

## 2024-08-27 DIAGNOSIS — Z95.0 PACEMAKER: ICD-10-CM

## 2024-08-27 DIAGNOSIS — I10 ESSENTIAL HYPERTENSION: ICD-10-CM

## 2024-08-27 NOTE — PROGRESS NOTES
Cardiac Electrophysiology Outpatient Note  [facility] at [facility]    Office Visit     Tom Harvey  [id number]  08/27/2024    Primary Care Physician: Meghan Burrell MD    Referred By: No ref. provider found    Subjective     Chief Complaint   Patient presents with    Sinus node dysfunction     Cardiac PMH: (Old records have been reviewed and summarized below)  1. Sinus Arrest               A) 16 second pause resulting in syncope               B)status post implantation of Biotronik dual-chamber pacemaker, Dr. Barron 2017   2. syncope in setting of #1  3. hypertension  4. Fatigue, chronic    History of Present Illness:   Tom Harvey is a 59 y.o. male who presents to my electrophysiology clinic for follow up of sick sinus syndrome s/p DC PPM.  We last saw the patient last August.  Since then he has done well.  He does have some shortness of breath on more exerting activities but this has been slowly progressing he feels that it is related to inactivity and deconditioning.  Otherwise, he is doing well with no chest pain, palpitations, orthopnea, lower extreme edema, lightheadedness/dizziness or syncopal episode.    Past Medical History:   Diagnosis Date    Arrhythmia     Asthma     Asystole by electrocardiogram 08/19/1999    PAC    Dyslipidemia 06/17/2018    Hypertension     Hypogonadism in male 06/17/2018    Irritable bowel syndrome with diarrhea 05/15/2018    Mitral valve prolapse     Osteoarthritis of spine with radiculopathy, cervical region 12/21/2018    Pacemaker     Polyarthralgia 05/15/2018    Syncope and collapse 03/11/2017    Vitamin B 12 deficiency 06/17/2018    Wears glasses        Past Surgical History:   Procedure Laterality Date    CARDIAC ELECTROPHYSIOLOGY PROCEDURE N/A 03/13/2017    Procedure: Pacemaker DC new;  Surgeon: Be Barron MD;  [facility];  Service:     COLONOSCOPY N/A 07/06/2018    Procedure: COLONOSCOPY;   "Surgeon: Hong Silverio MD;  Location: [facility] ENDOSCOPY;  Service: Gastroenterology    INSERT / REPLACE / REMOVE PACEMAKER      KNEE ARTHROSCOPY Left     Dr. Chaparro performed 15 - 16 years ago.    KNEE ARTHROSCOPY Right 2019    Dr. Claros    PACEMAKER IMPLANTATION         Family History   Problem Relation Age of Onset    Stroke Mother 43    Heart disease Father     Dysphagia Father        Social History     Socioeconomic History    Marital status:    Tobacco Use    Smoking status: Never     Passive exposure: Past    Smokeless tobacco: Never   Vaping Use    Vaping status: Never Used   Substance and Sexual Activity    Alcohol use: Not Currently    Drug use: Never    Sexual activity: Not Currently     Partners: Female         Current Outpatient Medications:     citalopram (CeleXA) 10 MG tablet, Take 1 tablet by mouth Daily., Disp: 90 tablet, Rfl: 2    fluticasone (FLONASE) 50 MCG/ACT nasal spray, Instill 2 sprays in Each Nostril Once Daily., Disp: 16 g, Rfl: 11    lisinopril (PRINIVIL,ZESTRIL) 30 MG tablet, Take 1 tablet by mouth Daily., Disp: 90 tablet, Rfl: 3    zolpidem (AMBIEN) 10 MG tablet, Take 1/2 - 1 tablet by mouth every night at bedtime as needed for sleep, Disp: 30 tablet, Rfl: 5    Allergies:   Allergies   Allergen Reactions    Levofloxacin Hives and Unknown - Low Severity       Objective   Vital Signs: Blood pressure 132/90, pulse 87, height 175.3 cm (69\"), weight 105 kg (232 lb), SpO2 96%.    PHYSICAL EXAM  General appearance: Awake, alert, cooperative  Head: Normocephalic, without obvious abnormality, atraumatic  Lungs: Clear to ascultation bilaterally  Heart: Regular rate and rhythm, no murmurs, no lower extremity swelling  Skin: Skin color, turgor normal, no rashes or lesions  Neurologic: Grossly normal     Lab Results   Component Value Date    GLUCOSE 101 (H) 01/31/2024    CALCIUM 9.7 01/31/2024     01/31/2024    K 4.3 01/31/2024    CO2 27.2 01/31/2024     01/31/2024    BUN 16 " 01/31/2024    CREATININE 1.13 01/31/2024    EGFRIFAFRI 95 05/15/2018    EGFRIFNONA 66 10/23/2020    BCR 14.2 01/31/2024    ANIONGAP 14.2 03/02/2022     Lab Results   Component Value Date    WBC 7.07 01/31/2024    HGB 15.3 01/31/2024    HCT 45.4 01/31/2024    MCV 88.5 01/31/2024     01/31/2024     Lab Results   Component Value Date    INR 0.98 03/11/2017    PROTIME 10.7 03/11/2017     Lab Results   Component Value Date    TSH 1.520 01/31/2024          Results for orders placed during the hospital encounter of 08/18/22    Adult Transthoracic Echo Complete W/ Cont if Necessary Per Protocol    Interpretation Summary  · Estimated left ventricular EF = 58% Left ventricular ejection fraction appears to be 56 - 60%. Left ventricular systolic function is normal.  · Left ventricular diastolic function was normal.         I personally viewed and interpreted the patient's EKG/Telemetry/lab data      ECG 12 Lead    Date/Time: 8/27/2024 3:48 PM  Performed by: Car Ricci PA-C    Authorized by: Car Ricci PA-C  Comparison: compared with previous ECG from 8/22/2023  Similar to previous ECG  Rhythm comments: Atrial paced rhythm  Rate: normal  BPM: 86    Clinical impression: normal ECG          Tom Harvey  reports that he has never smoked. He has been exposed to tobacco smoke. He has never used smokeless tobacco.       Advance Care Planning   Advance Care Planning: ACP discussion was declined by the patient. Patient does not have an advance directive, information provided.     Assessment & Plan    1. Sinus node dysfunction  S/p DC PPM. Patient notes good functional status in general and denies any lightheadedness, syncope or chest discomfort. He notes some dyspnea on exertion that is mild and likely due to deconditioning. Echo 2022 with normal LVSF, LVDF and no VHD.     2. S/P placement of cardiac pacemaker  Device interrogation acceptable in office today detailed below:     BTK manual interrogation: DC  PPM, DDD-CLS, rate 70, 80% Ap, 0% , normal threshold and impedance values, some short events including a 12 beat NSVT and 2 < 5 seconds atrial arrhythmias in December 2023    Patient with stable cardiac course.  Recommended >150 minutes of aerobic activity weekly.    3.  Hypertension  BP acceptable today in office.  Continue current antihypertensive regimen.    Follow Up:  Return in about 1 year (around 8/27/2025).      Thank you for allowing me to participate in the care of your patient. Please do not hesitate to contact me with additional questions or concerns.      Car Ricci PA-C  Cardiac Electrophysiology  [facility] / [facility]

## 2024-10-29 ENCOUNTER — TELEPHONE (OUTPATIENT)
Dept: FAMILY MEDICINE CLINIC | Facility: CLINIC | Age: 59
End: 2024-10-29
Payer: COMMERCIAL

## 2024-10-29 DIAGNOSIS — K62.89 ANAL OR RECTAL PAIN: Primary | ICD-10-CM

## 2024-10-29 NOTE — TELEPHONE ENCOUNTER
Caller: Tom Harvey    Relationship: Self    Best call back number: [phone]    What is the best time to reach you: ANYTIME    Who are you requesting to speak with (clinical staff, provider,  specific staff member):   PROVIDER    What was the call regarding: PATIENT STATES THAT HE HAS WHITE DISCHARGE COMING FROM HIS ANUS THAT HAS AN ODOR AND JUST TO THE SIDE IT'S SORE. PATIENT STATES THAT IT HAS BEEN GOING ON FOR 6 WEEKS.PATIENT IS CONCERNED AND WOULD LIKE TO BE ADVISED ASAP    Is it okay if the provider responds through MyChart: NO

## 2024-10-30 RX ORDER — CITALOPRAM HYDROBROMIDE 10 MG/1
10 TABLET ORAL DAILY
Qty: 90 TABLET | Refills: 2 | Status: SHIPPED | OUTPATIENT
Start: 2024-10-30

## 2024-11-06 NOTE — PROGRESS NOTES
Patient: Tom Harvey    YOB: 1965    Date: 11/14/2024    Primary Care Provider: Meghan Burrell MD    Chief Complaint   Patient presents with    Rectal Pain       SUBJECTIVE:    History of present illness:  Patient has a history significant for sick sinus syndrome, he does have a pacemaker.  I saw the patient in the office today as a consultation for evaluation and treatment for rectal pain.  Patient's last colonoscopy was performed by Dr. Silverio 07/06/2018, it showed internal hemorrhoids.    He does give a history significant for rectal discomfort and pressure, he has had rectal bleeding in the past.  He has had sick sinus syndrome in the past and currently has a pacemaker, he did have this evaluated by his cardiologist  in August of this year and it was normal.    The following portions of the patient's history were reviewed and updated as appropriate: allergies, current medications, past family history, past medical history, past social history, past surgical history and problem list.      Review of Systems:  Constitutional:  Negative for chills, fever, and unexpected weight change.  HENT: Negative for trouble swallowing and voice change.  Eyes:  Negative for visual disturbance.  Respiratory:  Negative for apnea, cough, chest tightness, shortness of breath, and wheezing.  Cardiovascular:  Negative for chest pain, palpitations, and leg swelling.  Gastrointestinal:  Negative for abdominal distention, abdominal pain, constipation, diarrhea, nausea, rectal pain, and vomiting.  There is blood per rectum  Musculoskeletal:  Negative for back pain, gait problem, and joint swelling.  Skin:  Negative for color change, rash, and wound  Neurological:  Negative for dizziness, syncope, speech difficulty, weakness, numbness, and headaches.  Hematological:  Negative for adenopathy.  Does not bruise/bleed easily.  Psychiatric/Behavioral:  Negative for confusion.  The patient is not  nervous/anxious.          History:  Past Medical History:   Diagnosis Date    Arrhythmia     Asthma     Asystole by electrocardiogram 08/19/1999    PAC    Dyslipidemia 06/17/2018    Hypertension     Hypogonadism in male 06/17/2018    Irritable bowel syndrome with diarrhea 05/15/2018    Mitral valve prolapse     Osteoarthritis of spine with radiculopathy, cervical region 12/21/2018    Pacemaker     Polyarthralgia 05/15/2018    Syncope and collapse 03/11/2017    Vitamin B 12 deficiency 06/17/2018    Wears glasses        Past Surgical History:   Procedure Laterality Date    CARDIAC ELECTROPHYSIOLOGY PROCEDURE N/A 03/13/2017    Procedure: Pacemaker DC new;  Surgeon: Be Barron MD;  Location:  [facility] INVASIVE LOCATION;  Service:     COLONOSCOPY N/A 07/06/2018    Procedure: COLONOSCOPY;  Surgeon: Hong Silverio MD;  Location: [facility] ENDOSCOPY;  Service: Gastroenterology    INSERT / REPLACE / REMOVE PACEMAKER      KNEE ARTHROSCOPY Left     Dr. Chaparro performed 15 - 16 years ago.    KNEE ARTHROSCOPY Right 2019    Dr. Claros    PACEMAKER IMPLANTATION         Family History   Problem Relation Age of Onset    Stroke Mother 43    Heart disease Father     Dysphagia Father        Social History     Tobacco Use    Smoking status: Never     Passive exposure: Past    Smokeless tobacco: Never   Vaping Use    Vaping status: Never Used   Substance Use Topics    Alcohol use: Not Currently    Drug use: Never       Allergies:  Allergies   Allergen Reactions    Levofloxacin Hives and Unknown - Low Severity       Medications:    Current Outpatient Medications:     citalopram (CeleXA) 10 MG tablet, Take 1 tablet by mouth Daily., Disp: 90 tablet, Rfl: 2    fluticasone (FLONASE) 50 MCG/ACT nasal spray, Instill 2 sprays in Each Nostril Once Daily., Disp: 16 g, Rfl: 11    lisinopril (PRINIVIL,ZESTRIL) 30 MG tablet, Take 1 tablet by mouth Daily., Disp: 90 tablet, Rfl: 3    zolpidem (AMBIEN) 10 MG tablet, Take 1/2 - 1 tablet by mouth  "every night at bedtime as needed for sleep, Disp: 30 tablet, Rfl: 5    OBJECTIVE:    Vital Signs:   Vitals:    11/14/24 1530   BP: 142/84   Pulse: 117   Temp: 98.2 °F (36.8 °C)   SpO2: 96%   Weight: 108 kg (239 lb)   Height: 175.3 cm (69.02\")         Physical Exam:     General Appearance:    Alert, cooperative, in no acute distress   Head:    Normocephalic, without obvious abnormality, atraumatic   Eyes:            Lids and lashes normal, conjunctivae and sclerae normal, no   icterus, no pallor, corneas clear, PERRLA   Ears:    Ears appear intact with no abnormalities noted   Throat:   No oral lesions, no thrush, oral mucosa moist   Neck:   No adenopathy, supple, trachea midline, no thyromegaly, no   carotid bruit, no JVD   Back:     No kyphosis present, no scoliosis present, no skin lesions,      erythema or scars, no tenderness to percussion or                   palpation,   range of motion normal   Lungs:     Clear to auscultation,respirations regular, even and                  unlabored    Heart:    Regular rhythm and normal rate, normal S1 and S2, no            murmur, no gallop, no rub, no click   Chest Wall:    No abnormalities observed   Abdomen:     Normal bowel sounds, no masses, no organomegaly, soft        non-tender, non-distended, no guarding,    Extremities:   Moves all extremities well, no edema, no cyanosis, no             redness   Pulses:   Pulses palpable and equal bilaterally   Skin:   No bleeding, bruising or rash   Lymph nodes:   No palpable adenopathy   Neurologic:   Cranial nerves 2 - 12 grossly intact, sensation intact, DTR       present and equal bilaterally        Results Review:   I reviewed the patient's new clinical results.  I reviewed the patient's new imaging results and agree with the interpretation.  I reviewed the patient's other test results and agree with the interpretation    Review of Systems was reviewed and confirmed as accurate as documented by the " MA.    ASSESSMENT/PLAN:    1. Rectal bleed        I did have a detailed and extensive discussion with the patient in the office today.  The full risks and benefits of operative versus nonoperative intervention were discussed with the paient, they understand, agree, and wish to proceed with the surgical treatment plan of colonoscopy.    Electronically signed by Severino Moura MD  11/14/24 10:32 EST

## 2024-11-14 ENCOUNTER — OFFICE VISIT (OUTPATIENT)
Dept: SURGERY | Facility: CLINIC | Age: 59
End: 2024-11-14
Payer: COMMERCIAL

## 2024-11-14 VITALS
HEART RATE: 117 BPM | DIASTOLIC BLOOD PRESSURE: 84 MMHG | TEMPERATURE: 98.2 F | WEIGHT: 239 LBS | OXYGEN SATURATION: 96 % | SYSTOLIC BLOOD PRESSURE: 142 MMHG | HEIGHT: 69 IN | BODY MASS INDEX: 35.4 KG/M2

## 2024-11-14 DIAGNOSIS — K62.5 RECTAL BLEED: Primary | ICD-10-CM

## 2024-11-14 PROCEDURE — 99204 OFFICE O/P NEW MOD 45 MIN: CPT | Performed by: SURGERY

## 2024-11-14 RX ORDER — POLYETHYLENE GLYCOL 3350 17 G/17G
POWDER, FOR SOLUTION ORAL
Qty: 238 G | Refills: 0 | Status: SHIPPED | OUTPATIENT
Start: 2024-11-14

## 2024-11-14 RX ORDER — BISACODYL 5 MG/1
TABLET, DELAYED RELEASE ORAL
Qty: 4 TABLET | Refills: 0 | Status: SHIPPED | OUTPATIENT
Start: 2024-11-14

## 2024-11-26 ENCOUNTER — OUTSIDE FACILITY SERVICE (OUTPATIENT)
Dept: SURGERY | Facility: CLINIC | Age: 59
End: 2024-11-26
Payer: COMMERCIAL

## 2024-12-14 DIAGNOSIS — F51.09 SITUATIONAL INSOMNIA: ICD-10-CM

## 2024-12-16 RX ORDER — ZOLPIDEM TARTRATE 10 MG/1
5-10 TABLET ORAL
Qty: 30 TABLET | Refills: 2 | Status: SHIPPED | OUTPATIENT
Start: 2024-12-16

## 2025-01-23 PROCEDURE — 93294 REM INTERROG EVL PM/LDLS PM: CPT | Performed by: STUDENT IN AN ORGANIZED HEALTH CARE EDUCATION/TRAINING PROGRAM

## 2025-01-23 PROCEDURE — 93296 REM INTERROG EVL PM/IDS: CPT | Performed by: STUDENT IN AN ORGANIZED HEALTH CARE EDUCATION/TRAINING PROGRAM

## 2025-01-27 ENCOUNTER — TELEPHONE (OUTPATIENT)
Dept: URGENT CARE | Facility: CLINIC | Age: 60
End: 2025-01-27

## 2025-01-27 DIAGNOSIS — I10 ESSENTIAL HYPERTENSION: ICD-10-CM

## 2025-01-27 RX ORDER — LISINOPRIL 30 MG/1
30 TABLET ORAL DAILY
Qty: 30 TABLET | Refills: 0 | Status: SHIPPED | OUTPATIENT
Start: 2025-01-27

## 2025-02-28 DIAGNOSIS — I10 ESSENTIAL HYPERTENSION: ICD-10-CM

## 2025-03-03 RX ORDER — LISINOPRIL 30 MG/1
30 TABLET ORAL DAILY
Qty: 30 TABLET | Refills: 0 | OUTPATIENT
Start: 2025-03-03

## 2025-03-10 RX ORDER — FLUTICASONE PROPIONATE 50 MCG
2 SPRAY, SUSPENSION (ML) NASAL DAILY
Qty: 16 G | Refills: 11 | OUTPATIENT
Start: 2025-03-10

## 2025-03-18 DIAGNOSIS — I10 ESSENTIAL HYPERTENSION: ICD-10-CM

## 2025-03-19 RX ORDER — FLUTICASONE PROPIONATE 50 MCG
2 SPRAY, SUSPENSION (ML) NASAL DAILY
Qty: 16 G | Refills: 11 | OUTPATIENT
Start: 2025-03-19

## 2025-03-19 RX ORDER — LISINOPRIL 30 MG/1
30 TABLET ORAL DAILY
Qty: 30 TABLET | Refills: 0 | Status: SHIPPED | OUTPATIENT
Start: 2025-03-19

## 2025-03-19 RX ORDER — LISINOPRIL 30 MG/1
30 TABLET ORAL DAILY
Qty: 30 TABLET | Refills: 0 | OUTPATIENT
Start: 2025-03-19

## 2025-03-19 NOTE — TELEPHONE ENCOUNTER
PT HAS APPT WITH DR. RICKETTS ON 4/2 WANTED TO KNOW IF WE COULD SEND HIM ENOUGH LISINOPRIL IN TO GET HIM THROUGH TO HIS APPT

## 2025-03-25 RX ORDER — FLUTICASONE PROPIONATE 50 MCG
2 SPRAY, SUSPENSION (ML) NASAL DAILY
Qty: 16 G | Refills: 11 | OUTPATIENT
Start: 2025-03-25

## 2025-04-02 ENCOUNTER — OFFICE VISIT (OUTPATIENT)
Dept: FAMILY MEDICINE CLINIC | Facility: CLINIC | Age: 60
End: 2025-04-02
Payer: COMMERCIAL

## 2025-04-02 VITALS
BODY MASS INDEX: 35.58 KG/M2 | HEART RATE: 83 BPM | HEIGHT: 69 IN | OXYGEN SATURATION: 96 % | DIASTOLIC BLOOD PRESSURE: 94 MMHG | WEIGHT: 240.2 LBS | SYSTOLIC BLOOD PRESSURE: 146 MMHG

## 2025-04-02 DIAGNOSIS — I10 ESSENTIAL HYPERTENSION: ICD-10-CM

## 2025-04-02 DIAGNOSIS — Z28.21 PNEUMOCOCCAL VACCINATION DECLINED BY PATIENT: ICD-10-CM

## 2025-04-02 DIAGNOSIS — Z00.00 WELL ADULT EXAM: Primary | ICD-10-CM

## 2025-04-02 DIAGNOSIS — E66.01 CLASS 2 SEVERE OBESITY DUE TO EXCESS CALORIES WITH SERIOUS COMORBIDITY AND BODY MASS INDEX (BMI) OF 35.0 TO 35.9 IN ADULT: ICD-10-CM

## 2025-04-02 DIAGNOSIS — E66.812 CLASS 2 SEVERE OBESITY DUE TO EXCESS CALORIES WITH SERIOUS COMORBIDITY AND BODY MASS INDEX (BMI) OF 35.0 TO 35.9 IN ADULT: ICD-10-CM

## 2025-04-02 DIAGNOSIS — Z12.5 SCREENING FOR PROSTATE CANCER: ICD-10-CM

## 2025-04-02 DIAGNOSIS — F41.1 GAD (GENERALIZED ANXIETY DISORDER): ICD-10-CM

## 2025-04-02 DIAGNOSIS — R53.82 CHRONIC FATIGUE: ICD-10-CM

## 2025-04-02 DIAGNOSIS — F51.09 SITUATIONAL INSOMNIA: ICD-10-CM

## 2025-04-02 DIAGNOSIS — E53.8 VITAMIN B 12 DEFICIENCY: ICD-10-CM

## 2025-04-02 RX ORDER — ZOLPIDEM TARTRATE 10 MG/1
5-10 TABLET ORAL
Qty: 90 TABLET | Refills: 1 | Status: SHIPPED | OUTPATIENT
Start: 2025-04-02

## 2025-04-02 RX ORDER — LISINOPRIL 30 MG/1
30 TABLET ORAL DAILY
Qty: 90 TABLET | Refills: 3 | Status: SHIPPED | OUTPATIENT
Start: 2025-04-02

## 2025-04-02 RX ORDER — CITALOPRAM HYDROBROMIDE 10 MG/1
10 TABLET ORAL DAILY
Qty: 90 TABLET | Refills: 3 | Status: SHIPPED | OUTPATIENT
Start: 2025-04-02

## 2025-04-02 NOTE — PROGRESS NOTES
"Subjective   Tom Harvey is a 60 y.o. male.     History of Present Illness  Here for annual check up.     , works full time at [employer]. Is a non-smoker. Denies EtOH use, no ilicit substance use.      Not getting regular exercise. Varied diet but excess calories. Follows standard safety precautions. Denies concern regarding STD exposure and declines screening.     Followed by cardiology for pacemaker mgnt. Denies symptoms.     On ace-inh for HTN. Some intermittent dry cough, does not associate it with his med.     On ambien as needed for insomnia. Working well. Denies side effects.     Family hx include mother with CVA in her 40s. No premature family h/o colon CA, CAD. No family prostate CA.     UTD on dental and vision check ups.     He co vocal fatigue by afternoon, muscle fatigue/proximal, more \"tired physically\".     Frequent stooling but denies \"diarrhea\". Has previous dx of IBSx    Low B12. Not currently on replacement..bernardo    Pt's previous HPI reviewed and updated as indicated.       The following portions of the patient's history were reviewed and updated as appropriate: allergies, current medications, past family history, past medical history, past social history, past surgical history, and problem list.    Review of Systems   Constitutional:  Positive for fatigue. Negative for fever.   HENT:  Negative for mouth sores, nosebleeds, sore throat and trouble swallowing.    Eyes:  Negative for visual disturbance.   Respiratory:  Negative for cough and shortness of breath.    Cardiovascular:  Negative for chest pain, palpitations and leg swelling.   Gastrointestinal:  Negative for abdominal pain, blood in stool, diarrhea, nausea and vomiting.   Genitourinary:  Negative for dysuria and hematuria.   Musculoskeletal:  Negative for arthralgias and myalgias.   Skin:  Negative for rash.   Neurological:  Positive for weakness (generalized muscle). Negative for seizures, syncope, facial asymmetry, speech " difficulty and headaches.   Hematological:  Negative for adenopathy. Does not bruise/bleed easily.   Psychiatric/Behavioral:  Positive for dysphoric mood (mildly) and sleep disturbance. Negative for confusion and suicidal ideas. The patient is nervous/anxious (mildly).    Pt's previous ROS reviewed and updated as indicated.       Objective   Vitals:    04/02/25 1425   BP: 146/94   Pulse: 83   SpO2: 96%     Body mass index is 35.47 kg/m².      04/02/25  1425   Weight: 109 kg (240 lb 3.2 oz)         Physical Exam  Vitals and nursing note reviewed.   Constitutional:       General: He is not in acute distress.     Appearance: He is well-groomed. He is obese. He is not ill-appearing.   HENT:      Head: Atraumatic.      Right Ear: Tympanic membrane, ear canal and external ear normal.      Left Ear: Tympanic membrane, ear canal and external ear normal.      Nose: Nose normal.      Mouth/Throat:      Mouth: Mucous membranes are moist. No oral lesions.      Pharynx: Oropharynx is clear.      Comments: Mallampati class 3  Eyes:      General: No scleral icterus.     Conjunctiva/sclera: Conjunctivae normal.   Neck:      Thyroid: No thyroid mass.      Vascular: Normal carotid pulses. No carotid bruit.   Cardiovascular:      Rate and Rhythm: Normal rate and regular rhythm.      Pulses: Normal pulses.      Heart sounds: Normal heart sounds.   Pulmonary:      Effort: Pulmonary effort is normal.      Breath sounds: Normal breath sounds.   Abdominal:      General: Bowel sounds are normal. There is no distension.      Palpations: Abdomen is soft. There is no mass.      Tenderness: There is no abdominal tenderness.   Musculoskeletal:      Right lower leg: No edema.      Left lower leg: No edema.   Lymphadenopathy:      Cervical: No cervical adenopathy.   Skin:     General: Skin is warm and dry.      Coloration: Skin is not jaundiced or pale.      Findings: No bruising or rash.   Neurological:      Mental Status: He is alert and  oriented to person, place, and time.      Motor: No tremor.      Gait: Gait is intact.   Psychiatric:         Mood and Affect: Mood normal.         Speech: Speech normal.         Behavior: Behavior normal. Behavior is cooperative.         Thought Content: Thought content normal.         Cognition and Memory: Cognition normal.         Judgment: Judgment normal.     Pt's previous physical exam reviewed and updated as indicated.      Assessment & Plan   Diagnoses and all orders for this visit:    1. Well adult exam (Primary)  -     CBC & Differential; Future  -     Comprehensive Metabolic Panel; Future  -     Lipid Panel; Future  -     TSH Rfx On Abnormal To Free T4; Future  -     Hemoglobin A1c; Future  -     PSA Screen; Future    2. Essential hypertension  -     lisinopril (PRINIVIL,ZESTRIL) 30 MG tablet; Take 1 tablet by mouth Daily. Patient NEEDS FOLLOW UP APPT FOR FURTHER REFILLS.  Dispense: 90 tablet; Refill: 3    3. Situational insomnia  -     zolpidem (AMBIEN) 10 MG tablet; Take 1/2 - 1 tablet by mouth every night at bedtime as needed for sleep  Dispense: 90 tablet; Refill: 1    4. Screening for prostate cancer  -     PSA Screen; Future    5. Vitamin B 12 deficiency  -     Vitamin B12; Future    6. Chronic fatigue  -     Vitamin B12; Future  -     Testosterone; Future  -     Iron Profile; Future  -     Ferritin; Future    7. Class 2 severe obesity due to excess calories with serious comorbidity and body mass index (BMI) of 35.0 to 35.9 in adult    8. CORNEL (generalized anxiety disorder)    9. Pneumococcal vaccination declined by patient    Other orders  -     citalopram (CeleXA) 10 MG tablet; Take 1 tablet by mouth Daily.  Dispense: 90 tablet; Refill: 3       Age appropriate preventive care reviewed including cancer screenings, safety measures, mental health concerns, supplements, prevention of CV disease and DM, etc. Handout provided.    Class 2 Severe Obesity (BMI >=35 and <=39.9). Obesity-related health  conditions include the following: hypertension, impaired fasting glucose, and dyslipidemias. Obesity is worsening. BMI is is above average; BMI management plan is completed. We discussed low calorie, low carb based diet program, portion control, increasing exercise, joining a fitness center or start home based exercise program, Weight Watchers or other Commercial based weight reduction program, and an roslyn-based approach such as AdNear Pal or Lose It. Nutrition and activity goals reviewed including: mainly water to drink, limit white flour/processed sugar, higher lean protein, high fiber carbs, regular meals, working toward 150 mins cardio per week.    Recommended sleep study for suspected JAS. He will consider further.    CORNEL stable - continue celexa.    HTN not quite controlled - continue lisinopril. Pt advised to eat a heart healthy diet and get regular aerobic exercise.    Continue ambien prn insomnia.  As part of patient's treatment plan I am prescribing a controlled substance.  The patient has been made aware of the appropriate use of such medications, including potential risk of somnolence, limited ability to drive and/or work safely, and potential for dependence and/or overdose.  It has also been made clear that these medications are for use by this patient only, without concomitant use of alcohol or other substances, unless prescribed.  History and physical exam exhibit continued safe and appropriate use of controlled substance.  ROSALINA reviewed.    Assess status of vit/min deficiencies and replace as indicated.    Routine f/u in 1 year for annual check up, f/u sooner as needed/instructed.  I will contact patient regarding test results and provide instructions regarding any necessary changes in plan of care.  Patient was encouraged to keep me informed of any acute changes, lack of improvement, or any new concerning symptoms.  Pt is aware of reasons to seek emergent care.  Patient voiced understanding of  all instructions and denied further questions.    Please note that portions of this note may have been completed with a voice recognition program.

## 2025-04-04 ENCOUNTER — LAB (OUTPATIENT)
Dept: LAB | Facility: HOSPITAL | Age: 60
End: 2025-04-04
Payer: COMMERCIAL

## 2025-04-04 DIAGNOSIS — R53.82 CHRONIC FATIGUE: ICD-10-CM

## 2025-04-04 DIAGNOSIS — Z12.5 SCREENING FOR PROSTATE CANCER: ICD-10-CM

## 2025-04-04 DIAGNOSIS — Z00.00 WELL ADULT EXAM: ICD-10-CM

## 2025-04-04 DIAGNOSIS — E53.8 VITAMIN B 12 DEFICIENCY: ICD-10-CM

## 2025-04-04 LAB
ALBUMIN SERPL-MCNC: 3.6 G/DL (ref 3.5–5.2)
ALBUMIN/GLOB SERPL: 1.3 G/DL
ALP SERPL-CCNC: 80 U/L (ref 39–117)
ALT SERPL W P-5'-P-CCNC: 26 U/L (ref 1–41)
ANION GAP SERPL CALCULATED.3IONS-SCNC: 11 MMOL/L (ref 5–15)
AST SERPL-CCNC: 20 U/L (ref 1–40)
BASOPHILS # BLD AUTO: 0.06 10*3/MM3 (ref 0–0.2)
BASOPHILS NFR BLD AUTO: 0.8 % (ref 0–1.5)
BILIRUB SERPL-MCNC: 0.3 MG/DL (ref 0–1.2)
BUN SERPL-MCNC: 14 MG/DL (ref 8–23)
BUN/CREAT SERPL: 13.1 (ref 7–25)
CALCIUM SPEC-SCNC: 9.4 MG/DL (ref 8.6–10.5)
CHLORIDE SERPL-SCNC: 107 MMOL/L (ref 98–107)
CHOLEST SERPL-MCNC: 201 MG/DL (ref 0–200)
CO2 SERPL-SCNC: 24 MMOL/L (ref 22–29)
CREAT SERPL-MCNC: 1.07 MG/DL (ref 0.76–1.27)
DEPRECATED RDW RBC AUTO: 44.1 FL (ref 37–54)
EGFRCR SERPLBLD CKD-EPI 2021: 79.4 ML/MIN/1.73
EOSINOPHIL # BLD AUTO: 0.22 10*3/MM3 (ref 0–0.4)
EOSINOPHIL NFR BLD AUTO: 3 % (ref 0.3–6.2)
ERYTHROCYTE [DISTWIDTH] IN BLOOD BY AUTOMATED COUNT: 12.8 % (ref 12.3–15.4)
FERRITIN SERPL-MCNC: 103 NG/ML (ref 30–400)
GLOBULIN UR ELPH-MCNC: 2.7 GM/DL
GLUCOSE SERPL-MCNC: 110 MG/DL (ref 65–99)
HBA1C MFR BLD: 6.1 % (ref 4.8–5.6)
HCT VFR BLD AUTO: 47.4 % (ref 37.5–51)
HDLC SERPL-MCNC: 36 MG/DL (ref 40–60)
HGB BLD-MCNC: 15.5 G/DL (ref 13–17.7)
IMM GRANULOCYTES # BLD AUTO: 0.02 10*3/MM3 (ref 0–0.05)
IMM GRANULOCYTES NFR BLD AUTO: 0.3 % (ref 0–0.5)
IRON 24H UR-MRATE: 66 MCG/DL (ref 59–158)
IRON SATN MFR SERPL: 18 % (ref 20–50)
LDLC SERPL CALC-MCNC: 141 MG/DL (ref 0–100)
LDLC/HDLC SERPL: 3.85 {RATIO}
LYMPHOCYTES # BLD AUTO: 2.28 10*3/MM3 (ref 0.7–3.1)
LYMPHOCYTES NFR BLD AUTO: 31.3 % (ref 19.6–45.3)
MCH RBC QN AUTO: 30.4 PG (ref 26.6–33)
MCHC RBC AUTO-ENTMCNC: 32.7 G/DL (ref 31.5–35.7)
MCV RBC AUTO: 92.9 FL (ref 79–97)
MONOCYTES # BLD AUTO: 0.72 10*3/MM3 (ref 0.1–0.9)
MONOCYTES NFR BLD AUTO: 9.9 % (ref 5–12)
NEUTROPHILS NFR BLD AUTO: 3.98 10*3/MM3 (ref 1.7–7)
NEUTROPHILS NFR BLD AUTO: 54.7 % (ref 42.7–76)
NRBC BLD AUTO-RTO: 0 /100 WBC (ref 0–0.2)
PLATELET # BLD AUTO: 229 10*3/MM3 (ref 140–450)
PMV BLD AUTO: 10.5 FL (ref 6–12)
POTASSIUM SERPL-SCNC: 4.3 MMOL/L (ref 3.5–5.2)
PROT SERPL-MCNC: 6.3 G/DL (ref 6–8.5)
PSA SERPL-MCNC: 1.73 NG/ML (ref 0–4)
RBC # BLD AUTO: 5.1 10*6/MM3 (ref 4.14–5.8)
SODIUM SERPL-SCNC: 142 MMOL/L (ref 136–145)
TESTOST SERPL-MCNC: 424 NG/DL (ref 193–740)
TIBC SERPL-MCNC: 364 MCG/DL (ref 298–536)
TRANSFERRIN SERPL-MCNC: 244 MG/DL (ref 200–360)
TRIGL SERPL-MCNC: 132 MG/DL (ref 0–150)
TSH SERPL DL<=0.05 MIU/L-ACNC: 2.38 UIU/ML (ref 0.27–4.2)
VIT B12 BLD-MCNC: 349 PG/ML (ref 211–946)
VLDLC SERPL-MCNC: 24 MG/DL (ref 5–40)
WBC NRBC COR # BLD AUTO: 7.28 10*3/MM3 (ref 3.4–10.8)

## 2025-04-04 PROCEDURE — 36415 COLL VENOUS BLD VENIPUNCTURE: CPT

## 2025-04-04 PROCEDURE — 82607 VITAMIN B-12: CPT

## 2025-04-04 PROCEDURE — 80050 GENERAL HEALTH PANEL: CPT

## 2025-04-04 PROCEDURE — 84466 ASSAY OF TRANSFERRIN: CPT

## 2025-04-04 PROCEDURE — 82728 ASSAY OF FERRITIN: CPT

## 2025-04-04 PROCEDURE — 83036 HEMOGLOBIN GLYCOSYLATED A1C: CPT

## 2025-04-04 PROCEDURE — G0103 PSA SCREENING: HCPCS

## 2025-04-04 PROCEDURE — 84403 ASSAY OF TOTAL TESTOSTERONE: CPT

## 2025-04-04 PROCEDURE — 80061 LIPID PANEL: CPT

## 2025-04-04 PROCEDURE — 83540 ASSAY OF IRON: CPT

## 2025-04-10 PROBLEM — E66.812 CLASS 2 SEVERE OBESITY DUE TO EXCESS CALORIES WITH SERIOUS COMORBIDITY AND BODY MASS INDEX (BMI) OF 35.0 TO 35.9 IN ADULT: Status: ACTIVE | Noted: 2018-05-15

## 2025-04-10 PROBLEM — E66.01 CLASS 2 SEVERE OBESITY DUE TO EXCESS CALORIES WITH SERIOUS COMORBIDITY AND BODY MASS INDEX (BMI) OF 35.0 TO 35.9 IN ADULT: Status: ACTIVE | Noted: 2018-05-15

## 2025-04-25 RX ORDER — FLUTICASONE PROPIONATE 50 MCG
2 SPRAY, SUSPENSION (ML) NASAL DAILY
Qty: 16 G | Refills: 11 | Status: SHIPPED | OUTPATIENT
Start: 2025-04-25

## 2025-08-27 LAB
MDC_IDC_MSMT_BATTERY_REMAINING_PERCENTAGE: 45 %
MDC_IDC_MSMT_BATTERY_STATUS: NORMAL
MDC_IDC_MSMT_LEADCHNL_RA_DTM: NORMAL
MDC_IDC_MSMT_LEADCHNL_RA_IMPEDANCE_VALUE: 527
MDC_IDC_MSMT_LEADCHNL_RA_PACING_THRESHOLD_AMPLITUDE: 0.7
MDC_IDC_MSMT_LEADCHNL_RA_PACING_THRESHOLD_POLARITY: NORMAL
MDC_IDC_MSMT_LEADCHNL_RA_PACING_THRESHOLD_PULSEWIDTH: 0.4
MDC_IDC_MSMT_LEADCHNL_RA_SENSING_INTR_AMPL: 3.8
MDC_IDC_MSMT_LEADCHNL_RV_DTM: NORMAL
MDC_IDC_MSMT_LEADCHNL_RV_IMPEDANCE_VALUE: 488
MDC_IDC_MSMT_LEADCHNL_RV_PACING_THRESHOLD_AMPLITUDE: 1.4
MDC_IDC_MSMT_LEADCHNL_RV_PACING_THRESHOLD_POLARITY: NORMAL
MDC_IDC_MSMT_LEADCHNL_RV_PACING_THRESHOLD_PULSEWIDTH: 0.4
MDC_IDC_MSMT_LEADCHNL_RV_SENSING_INTR_AMPL: 9.3
MDC_IDC_PG_IMPLANT_DTM: NORMAL
MDC_IDC_PG_MFG: NORMAL
MDC_IDC_PG_MODEL: NORMAL
MDC_IDC_PG_SERIAL: NORMAL
MDC_IDC_PG_TYPE: NORMAL
MDC_IDC_SESS_DTM: NORMAL
MDC_IDC_SESS_TYPE: NORMAL
MDC_IDC_SET_BRADY_AT_MODE_SWITCH_RATE: 170
MDC_IDC_SET_BRADY_LOWRATE: 70
MDC_IDC_SET_BRADY_MAX_SENSOR_RATE: 140
MDC_IDC_SET_BRADY_MAX_TRACKING_RATE: 150
MDC_IDC_SET_BRADY_MODE: NORMAL
MDC_IDC_SET_BRADY_PAV_DELAY: 195
MDC_IDC_SET_BRADY_SAV_DELAY: 195
MDC_IDC_SET_LEADCHNL_RA_PACING_AMPLITUDE: 1.7
MDC_IDC_SET_LEADCHNL_RA_PACING_POLARITY: NORMAL
MDC_IDC_SET_LEADCHNL_RA_PACING_PULSEWIDTH: 0.4
MDC_IDC_SET_LEADCHNL_RA_SENSING_POLARITY: NORMAL
MDC_IDC_SET_LEADCHNL_RV_PACING_AMPLITUDE: 1.9
MDC_IDC_SET_LEADCHNL_RV_PACING_POLARITY: NORMAL
MDC_IDC_SET_LEADCHNL_RV_PACING_PULSEWIDTH: 0.4
MDC_IDC_SET_LEADCHNL_RV_SENSING_POLARITY: NORMAL
MDC_IDC_STAT_AT_BURDEN_PERCENT: 0
MDC_IDC_STAT_BRADY_RA_PERCENT_PACED: 77
MDC_IDC_STAT_BRADY_RV_PERCENT_PACED: 0

## (undated) DEVICE — CAUTERY TIP POLISHER: Brand: DEVON

## (undated) DEVICE — ADULT, W/LG. BACK PAD, RADIOTRANSPARENT ELEMENT AND LEAD WIRE: Brand: DEFIBRILLATION ELECTRODES

## (undated) DEVICE — ST INF PRI SMRTSTE 20DRP 2VLV 24ML 117

## (undated) DEVICE — TUBING, SUCTION, 1/4" X 10', STRAIGHT: Brand: MEDLINE

## (undated) DEVICE — SET PRIMARY GRVTY 10DP MALE LL 104IN

## (undated) DEVICE — ARM SLING II: Brand: DEROYAL

## (undated) DEVICE — LEX ELECTRO PHYSIOLOGY: Brand: MEDLINE INDUSTRIES, INC.

## (undated) DEVICE — INTRO TEAR AWAY/LVD W/SD PRT 6F 13CM

## (undated) DEVICE — Device

## (undated) DEVICE — LIMB HOLDERS: Brand: DEROYAL

## (undated) DEVICE — IRRIGATOR BULB ASEPTO 60CC STRL

## (undated) DEVICE — PENCL E/S HNDSWCH ROCKRBTN HOLSTR 10FT

## (undated) DEVICE — DECANT BG O JET

## (undated) DEVICE — DECANTER: Brand: UNBRANDED

## (undated) DEVICE — ENDOGATOR AUXILIARY WATER JET CONNECTOR: Brand: ENDOGATOR

## (undated) DEVICE — ELECTRD BLD EZ CLN STD 2.5IN

## (undated) DEVICE — CANN NASL CO2 DIVIDED A/

## (undated) DEVICE — SOL NACL 0.9PCT 1000ML

## (undated) DEVICE — ST EXT IV SMARTSITE 2VLV SP M LL 5ML IV1

## (undated) DEVICE — MEDI-VAC YANKAUER SUCTION HANDLE W/BULBOUS TIP: Brand: CARDINAL HEALTH

## (undated) DEVICE — 3M™ TEGADERM™ CHG DRESSING 25/CARTON 4 CARTONS/CASE 1659: Brand: TEGADERM™

## (undated) DEVICE — 3M™ STERI-STRIP™ REINFORCED ADHESIVE SKIN CLOSURES, R1547, 1/2 IN X 4 IN (12 MM X 100 MM), 6 STRIPS/ENVELOPE: Brand: 3M™ STERI-STRIP™